# Patient Record
Sex: FEMALE | Race: WHITE | Employment: OTHER | URBAN - METROPOLITAN AREA
[De-identification: names, ages, dates, MRNs, and addresses within clinical notes are randomized per-mention and may not be internally consistent; named-entity substitution may affect disease eponyms.]

---

## 2018-06-14 ENCOUNTER — HOSPITAL ENCOUNTER (OUTPATIENT)
Dept: OTHER | Age: 38
Discharge: HOME | End: 2018-06-14
Payer: MEDICARE

## 2018-06-14 PROCEDURE — 99211 OFF/OP EST MAY X REQ PHY/QHP: CPT

## 2018-09-28 ENCOUNTER — APPOINTMENT (OUTPATIENT)
Dept: CT IMAGING | Age: 38
End: 2018-09-28
Payer: MEDICARE

## 2018-09-28 ENCOUNTER — HOSPITAL ENCOUNTER (EMERGENCY)
Age: 38
Discharge: HOME | End: 2018-09-28
Attending: EMERGENCY MEDICINE
Payer: MEDICARE

## 2018-09-28 VITALS
HEIGHT: 63 IN | HEART RATE: 88 BPM | WEIGHT: 135 LBS | OXYGEN SATURATION: 100 % | RESPIRATION RATE: 14 BRPM | DIASTOLIC BLOOD PRESSURE: 78 MMHG | SYSTOLIC BLOOD PRESSURE: 125 MMHG | BODY MASS INDEX: 23.92 KG/M2 | TEMPERATURE: 97.6 F

## 2018-09-28 DIAGNOSIS — R11.0 NAUSEA: ICD-10-CM

## 2018-09-28 DIAGNOSIS — R10.13 ABDOMINAL PAIN, EPIGASTRIC: Primary | ICD-10-CM

## 2018-09-28 LAB
ALBUMIN SERPL-MCNC: 3.9 G/DL (ref 3.5–5.2)
ALP BLD-CCNC: 75 U/L (ref 35–104)
ALT SERPL-CCNC: 11 U/L (ref 0–32)
ANION GAP SERPL CALCULATED.3IONS-SCNC: 11 MMOL/L (ref 7–16)
AST SERPL-CCNC: 16 U/L (ref 0–31)
BACTERIA: ABNORMAL /HPF
BASOPHILS ABSOLUTE: 0.05 E9/L (ref 0–0.2)
BASOPHILS RELATIVE PERCENT: 0.8 % (ref 0–2)
BETA-HYDROXYBUTYRATE: 0.43 MMOL/L (ref 0.02–0.27)
BILIRUB SERPL-MCNC: 0.4 MG/DL (ref 0–1.2)
BILIRUBIN URINE: NEGATIVE
BLOOD, URINE: NEGATIVE
BUN BLDV-MCNC: 29 MG/DL (ref 6–20)
CALCIUM SERPL-MCNC: 9.7 MG/DL (ref 8.6–10.2)
CHLORIDE BLD-SCNC: 100 MMOL/L (ref 98–107)
CHP ED QC CHECK: NORMAL
CLARITY: CLEAR
CO2: 27 MMOL/L (ref 22–29)
COLOR: YELLOW
CREAT SERPL-MCNC: 2.7 MG/DL (ref 0.5–1)
EOSINOPHILS ABSOLUTE: 0.17 E9/L (ref 0.05–0.5)
EOSINOPHILS RELATIVE PERCENT: 2.7 % (ref 0–6)
GFR AFRICAN AMERICAN: 24
GFR NON-AFRICAN AMERICAN: 20 ML/MIN/1.73
GLUCOSE BLD-MCNC: 76 MG/DL (ref 74–109)
GLUCOSE BLD-MCNC: 84 MG/DL
GLUCOSE URINE: NEGATIVE MG/DL
HCT VFR BLD CALC: 32.8 % (ref 34–48)
HEMOGLOBIN: 10.8 G/DL (ref 11.5–15.5)
IMMATURE GRANULOCYTES #: 0.01 E9/L
IMMATURE GRANULOCYTES %: 0.2 % (ref 0–5)
KETONES, URINE: NEGATIVE MG/DL
LEUKOCYTE ESTERASE, URINE: NEGATIVE
LIPASE: 48 U/L (ref 13–60)
LYMPHOCYTES ABSOLUTE: 2.28 E9/L (ref 1.5–4)
LYMPHOCYTES RELATIVE PERCENT: 36.5 % (ref 20–42)
MCH RBC QN AUTO: 30.9 PG (ref 26–35)
MCHC RBC AUTO-ENTMCNC: 32.9 % (ref 32–34.5)
MCV RBC AUTO: 93.7 FL (ref 80–99.9)
METER GLUCOSE: 84 MG/DL (ref 70–110)
MONOCYTES ABSOLUTE: 0.43 E9/L (ref 0.1–0.95)
MONOCYTES RELATIVE PERCENT: 6.9 % (ref 2–12)
NEUTROPHILS ABSOLUTE: 3.31 E9/L (ref 1.8–7.3)
NEUTROPHILS RELATIVE PERCENT: 52.9 % (ref 43–80)
NITRITE, URINE: NEGATIVE
PDW BLD-RTO: 12.7 FL (ref 11.5–15)
PH UA: 5.5 (ref 5–9)
PH VENOUS: 7.32 (ref 7.3–7.42)
PLATELET # BLD: 164 E9/L (ref 130–450)
PMV BLD AUTO: 11.8 FL (ref 7–12)
POTASSIUM SERPL-SCNC: 4.5 MMOL/L (ref 3.5–5)
PROTEIN UA: 30 MG/DL
RBC # BLD: 3.5 E12/L (ref 3.5–5.5)
RBC UA: ABNORMAL /HPF (ref 0–2)
SODIUM BLD-SCNC: 138 MMOL/L (ref 132–146)
SPECIFIC GRAVITY UA: <=1.005 (ref 1–1.03)
TOTAL PROTEIN: 7 G/DL (ref 6.4–8.3)
UROBILINOGEN, URINE: 0.2 E.U./DL
WBC # BLD: 6.3 E9/L (ref 4.5–11.5)
WBC UA: ABNORMAL /HPF (ref 0–5)

## 2018-09-28 PROCEDURE — 74176 CT ABD & PELVIS W/O CONTRAST: CPT

## 2018-09-28 PROCEDURE — 99284 EMERGENCY DEPT VISIT MOD MDM: CPT

## 2018-09-28 PROCEDURE — 82800 BLOOD PH: CPT

## 2018-09-28 PROCEDURE — 82010 KETONE BODYS QUAN: CPT

## 2018-09-28 PROCEDURE — 82962 GLUCOSE BLOOD TEST: CPT

## 2018-09-28 PROCEDURE — 85025 COMPLETE CBC W/AUTO DIFF WBC: CPT

## 2018-09-28 PROCEDURE — C9113 INJ PANTOPRAZOLE SODIUM, VIA: HCPCS | Performed by: EMERGENCY MEDICINE

## 2018-09-28 PROCEDURE — 96374 THER/PROPH/DIAG INJ IV PUSH: CPT

## 2018-09-28 PROCEDURE — 83690 ASSAY OF LIPASE: CPT

## 2018-09-28 PROCEDURE — 36415 COLL VENOUS BLD VENIPUNCTURE: CPT

## 2018-09-28 PROCEDURE — 81001 URINALYSIS AUTO W/SCOPE: CPT

## 2018-09-28 PROCEDURE — 80053 COMPREHEN METABOLIC PANEL: CPT

## 2018-09-28 PROCEDURE — 96375 TX/PRO/DX INJ NEW DRUG ADDON: CPT

## 2018-09-28 PROCEDURE — 6360000002 HC RX W HCPCS: Performed by: EMERGENCY MEDICINE

## 2018-09-28 RX ORDER — 0.9 % SODIUM CHLORIDE 0.9 %
1000 INTRAVENOUS SOLUTION INTRAVENOUS ONCE
Status: DISCONTINUED | OUTPATIENT
Start: 2018-09-28 | End: 2018-09-28 | Stop reason: HOSPADM

## 2018-09-28 RX ORDER — ONDANSETRON 2 MG/ML
4 INJECTION INTRAMUSCULAR; INTRAVENOUS ONCE
Status: COMPLETED | OUTPATIENT
Start: 2018-09-28 | End: 2018-09-28

## 2018-09-28 RX ORDER — ONDANSETRON 4 MG/1
4 TABLET, ORALLY DISINTEGRATING ORAL EVERY 8 HOURS PRN
Qty: 24 TABLET | Refills: 0 | Status: SHIPPED | OUTPATIENT
Start: 2018-09-28

## 2018-09-28 RX ORDER — PANTOPRAZOLE SODIUM 40 MG/10ML
40 INJECTION, POWDER, LYOPHILIZED, FOR SOLUTION INTRAVENOUS ONCE
Status: COMPLETED | OUTPATIENT
Start: 2018-09-28 | End: 2018-09-28

## 2018-09-28 RX ORDER — PANTOPRAZOLE SODIUM 40 MG/1
40 TABLET, DELAYED RELEASE ORAL DAILY
Qty: 10 TABLET | Refills: 0 | Status: SHIPPED | OUTPATIENT
Start: 2018-09-28 | End: 2020-05-21

## 2018-09-28 RX ADMIN — PANTOPRAZOLE SODIUM 40 MG: 40 INJECTION, POWDER, FOR SOLUTION INTRAVENOUS at 19:31

## 2018-09-28 RX ADMIN — ONDANSETRON 4 MG: 2 INJECTION INTRAMUSCULAR; INTRAVENOUS at 19:31

## 2018-09-28 ASSESSMENT — ENCOUNTER SYMPTOMS
WHEEZING: 0
SHORTNESS OF BREATH: 0
VOMITING: 0
BACK PAIN: 0
EYE PAIN: 0
NAUSEA: 1
DIARRHEA: 1
EYE REDNESS: 0
ABDOMINAL PAIN: 1
COUGH: 0
ABDOMINAL DISTENTION: 0
EYE DISCHARGE: 0
SINUS PRESSURE: 0
SORE THROAT: 0

## 2018-09-28 ASSESSMENT — PAIN SCALES - GENERAL: PAINLEVEL_OUTOF10: 3

## 2018-09-28 ASSESSMENT — PAIN DESCRIPTION - PAIN TYPE: TYPE: ACUTE PAIN

## 2018-09-28 ASSESSMENT — PAIN DESCRIPTION - LOCATION: LOCATION: ABDOMEN

## 2018-09-28 ASSESSMENT — PAIN DESCRIPTION - ORIENTATION: ORIENTATION: MID;UPPER

## 2018-09-28 NOTE — ED NOTES
Radiology Procedure Waiver   Name: Lopez Mosqueda  : 1980  MRN: [id number]    Date:  18    Time: 6:06 PM    Benefits of immediately proceeding with radiology exam(s) without pre-testing outweigh the risks or are not indicated as specified below and therefore the following is/are being waived:    [] Benefits of immediate radiology exam(s) outweigh any risk. OR    Pre-exam testing is not indicated for the following reason(s):  [x] Pregnancy test   [] Patients LMP on-time and regular.   [] Patient had Tubal Ligation or has other Contraception Device. [] Patient  is Menopausal or Premenarcheal.    [x] Patient had Full or Partial Hysterectomy. [] Protocol for CT contrast allegry   [] Patient has tolerated well previously   [] Patient does not have a true allergy    [] MRI Questionnaire     [] BUN/Creatinine   [] Patient age w/no hx of renal dysfunction. [] Patient on Dialysis. [] Recent Normal Labs.   Electronically signed by Remberto Beltran DO on 18 at 6:06 PM               Remberto Beltran DO  Resident  [phone]

## 2018-09-28 NOTE — ED PROVIDER NOTES
52-year-old feeling presents for evaluation of abdominal pain, nausea. He ports since Monday she has had midepigastric abdominal pain described as sharp that is worse with eating and laying down. She has complains of nausea without vomiting. She currently complains rates the pain as 3/10 but has not in all day. She reports history of type I diabetes that is controlled on insulin,. She reports her sugars were persistently near 200 this morning despite not eating much but prior presentation came down to 100. He denies any fevers or chills. She does report dark colored diarrhea today. She reports history of ulcers about 10 years ago. Review of Systems   Constitutional: Negative for chills and fever. HENT: Negative for ear pain, sinus pressure and sore throat. Eyes: Negative for pain, discharge and redness. Respiratory: Negative for cough, shortness of breath and wheezing. Cardiovascular: Negative for chest pain. Gastrointestinal: Positive for abdominal pain, diarrhea and nausea. Negative for abdominal distention and vomiting. Genitourinary: Negative for dysuria and frequency. Musculoskeletal: Negative for arthralgias and back pain. Skin: Negative for rash and wound. Neurological: Negative for weakness and headaches. Hematological: Negative for adenopathy. All other systems reviewed and are negative. Physical Exam   Constitutional: She is oriented to person, place, and time. She appears well-developed and well-nourished. HENT:   Head: Normocephalic and atraumatic. Eyes: Conjunctivae are normal.   Neck: Normal range of motion. Neck supple. Cardiovascular: Normal rate, regular rhythm and normal heart sounds. No murmur heard. Pulmonary/Chest: Effort normal and breath sounds normal. No respiratory distress. She has no wheezes. She has no rales. Abdominal: Soft. Bowel sounds are normal. There is tenderness (epigastric, suprapubic). There is no rebound and no guarding. % 52.9 43.0 - 80.0 %    Immature Granulocytes % 0.2 0.0 - 5.0 %    Lymphocytes % 36.5 20.0 - 42.0 %    Monocytes % 6.9 2.0 - 12.0 %    Eosinophils % 2.7 0.0 - 6.0 %    Basophils % 0.8 0.0 - 2.0 %    Neutrophils # 3.31 1.80 - 7.30 E9/L    Immature Granulocytes # 0.01 E9/L    Lymphocytes # 2.28 1.50 - 4.00 E9/L    Monocytes # 0.43 0.10 - 0.95 E9/L    Eosinophils # 0.17 0.05 - 0.50 E9/L    Basophils # 0.05 0.00 - 0.20 E9/L   pH, venous   Result Value Ref Range    pH, Sherman 7.32 7.30 - 7.42   Urinalysis with Microscopic   Result Value Ref Range    Color, UA Yellow Straw/Yellow    Clarity, UA Clear Clear    Glucose, Ur Negative Negative mg/dL    Bilirubin Urine Negative Negative    Ketones, Urine Negative Negative mg/dL    Specific Gravity, UA <=1.005 1.005 - 1.030    Blood, Urine Negative Negative    pH, UA 5.5 5.0 - 9.0    Protein, UA 30 (A) Negative mg/dL    Urobilinogen, Urine 0.2 <2.0 E.U./dL    Nitrite, Urine Negative Negative    Leukocyte Esterase, Urine Negative Negative    WBC, UA 0-1 0 - 5 /HPF    RBC, UA NONE 0 - 2 /HPF    Bacteria, UA MODERATE (A) /HPF   Comprehensive metabolic panel   Result Value Ref Range    Sodium 138 132 - 146 mmol/L    Potassium 4.5 3.5 - 5.0 mmol/L    Chloride 100 98 - 107 mmol/L    CO2 27 22 - 29 mmol/L    Anion Gap 11 7 - 16 mmol/L    Glucose 76 74 - 109 mg/dL    BUN 29 (H) 6 - 20 mg/dL    CREATININE 2.7 (H) 0.5 - 1.0 mg/dL    GFR Non-African American 20 >=60 mL/min/1.73    GFR African American 24     Calcium 9.7 8.6 - 10.2 mg/dL    Total Protein 7.0 6.4 - 8.3 g/dL    Alb 3.9 3.5 - 5.2 g/dL    Total Bilirubin 0.4 0.0 - 1.2 mg/dL    Alkaline Phosphatase 75 35 - 104 U/L    ALT 11 0 - 32 U/L    AST 16 0 - 31 U/L   Lipase   Result Value Ref Range    Lipase 48 13 - 60 U/L   Beta-Hydroxybutyrate   Result Value Ref Range    Beta-Hydroxybutyrate 0.43 (H) 0.02 - 0.27 mmol/L   POCT Glucose   Result Value Ref Range    Glucose 84 mg/dL    QC OK?  Ok    POCT Glucose   Result Value Ref Range    Meter Nausea or Vomiting, Disp-24 tablet, R-0Print             Diagnosis:  1. Abdominal pain, epigastric    2. Nausea        Disposition:  Patient's disposition: Discharge to home  Patient's condition is stable.              Meghan Chavez DO  Resident  09/28/18 [id number]

## 2018-11-21 ENCOUNTER — HOSPITAL ENCOUNTER (OUTPATIENT)
Age: 38
Discharge: HOME | End: 2018-11-21
Payer: MEDICARE

## 2018-11-21 LAB
ALBUMIN SERPL-MCNC: 4.5 G/DL (ref 3.5–5.2)
ALP BLD-CCNC: 75 U/L (ref 35–104)
ALT SERPL-CCNC: 11 U/L (ref 0–32)
ANION GAP SERPL CALCULATED.3IONS-SCNC: 13 MMOL/L (ref 7–16)
AST SERPL-CCNC: 16 U/L (ref 0–31)
BACTERIA: ABNORMAL /HPF
BASOPHILS ABSOLUTE: 0.05 E9/L (ref 0–0.2)
BASOPHILS RELATIVE PERCENT: 0.7 % (ref 0–2)
BILIRUB SERPL-MCNC: 0.4 MG/DL (ref 0–1.2)
BILIRUBIN URINE: NEGATIVE
BLOOD, URINE: NEGATIVE
BUN BLDV-MCNC: 33 MG/DL (ref 6–20)
CALCIUM SERPL-MCNC: 9.5 MG/DL (ref 8.6–10.2)
CHLORIDE BLD-SCNC: 96 MMOL/L (ref 98–107)
CLARITY: CLEAR
CO2: 25 MMOL/L (ref 22–29)
COLOR: YELLOW
CREAT SERPL-MCNC: 2.8 MG/DL (ref 0.5–1)
CREATININE URINE: 50 MG/DL (ref 29–226)
EOSINOPHILS ABSOLUTE: 1.33 E9/L (ref 0.05–0.5)
EOSINOPHILS RELATIVE PERCENT: 19 % (ref 0–6)
GFR AFRICAN AMERICAN: 23
GFR NON-AFRICAN AMERICAN: 19 ML/MIN/1.73
GLUCOSE BLD-MCNC: 134 MG/DL (ref 74–99)
GLUCOSE URINE: NEGATIVE MG/DL
HCT VFR BLD CALC: 31.6 % (ref 34–48)
HEMOGLOBIN: 10.4 G/DL (ref 11.5–15.5)
IMMATURE GRANULOCYTES #: 0.01 E9/L
IMMATURE GRANULOCYTES %: 0.1 % (ref 0–5)
KETONES, URINE: NEGATIVE MG/DL
LEUKOCYTE ESTERASE, URINE: ABNORMAL
LYMPHOCYTES ABSOLUTE: 2.23 E9/L (ref 1.5–4)
LYMPHOCYTES RELATIVE PERCENT: 31.9 % (ref 20–42)
MAGNESIUM: 2.2 MG/DL (ref 1.6–2.6)
MCH RBC QN AUTO: 31 PG (ref 26–35)
MCHC RBC AUTO-ENTMCNC: 32.9 % (ref 32–34.5)
MCV RBC AUTO: 94 FL (ref 80–99.9)
MONOCYTES ABSOLUTE: 0.5 E9/L (ref 0.1–0.95)
MONOCYTES RELATIVE PERCENT: 7.2 % (ref 2–12)
NEUTROPHILS ABSOLUTE: 2.87 E9/L (ref 1.8–7.3)
NEUTROPHILS RELATIVE PERCENT: 41.1 % (ref 43–80)
NITRITE, URINE: NEGATIVE
PARATHYROID HORMONE INTACT: 58 PG/ML (ref 15–65)
PDW BLD-RTO: 12.4 FL (ref 11.5–15)
PH UA: 6 (ref 5–9)
PHOSPHORUS: 3.3 MG/DL (ref 2.5–4.5)
PLATELET # BLD: 194 E9/L (ref 130–450)
PMV BLD AUTO: 12.1 FL (ref 7–12)
POTASSIUM SERPL-SCNC: 4.4 MMOL/L (ref 3.5–5)
PROTEIN PROTEIN: 26 MG/DL (ref 0–12)
PROTEIN UA: ABNORMAL MG/DL
PROTEIN/CREAT RATIO: 0.5
PROTEIN/CREAT RATIO: 0.5 (ref 0–0.2)
RBC # BLD: 3.36 E12/L (ref 3.5–5.5)
RBC UA: ABNORMAL /HPF (ref 0–2)
SODIUM BLD-SCNC: 134 MMOL/L (ref 132–146)
SPECIFIC GRAVITY UA: <=1.005 (ref 1–1.03)
TOTAL PROTEIN: 7.6 G/DL (ref 6.4–8.3)
URIC ACID, SERUM: 7.6 MG/DL (ref 2.4–5.7)
UROBILINOGEN, URINE: 0.2 E.U./DL
VITAMIN D 25-HYDROXY: >120 NG/ML (ref 30–100)
WBC # BLD: 7 E9/L (ref 4.5–11.5)
WBC UA: ABNORMAL /HPF (ref 0–5)

## 2018-11-21 PROCEDURE — 87088 URINE BACTERIA CULTURE: CPT

## 2018-11-21 PROCEDURE — 84100 ASSAY OF PHOSPHORUS: CPT

## 2018-11-21 PROCEDURE — 81001 URINALYSIS AUTO W/SCOPE: CPT

## 2018-11-21 PROCEDURE — 84550 ASSAY OF BLOOD/URIC ACID: CPT

## 2018-11-21 PROCEDURE — 83735 ASSAY OF MAGNESIUM: CPT

## 2018-11-21 PROCEDURE — 82306 VITAMIN D 25 HYDROXY: CPT

## 2018-11-21 PROCEDURE — 80053 COMPREHEN METABOLIC PANEL: CPT

## 2018-11-21 PROCEDURE — 85025 COMPLETE CBC W/AUTO DIFF WBC: CPT

## 2018-11-21 PROCEDURE — 36415 COLL VENOUS BLD VENIPUNCTURE: CPT

## 2018-11-21 PROCEDURE — 82570 ASSAY OF URINE CREATININE: CPT

## 2018-11-21 PROCEDURE — 83970 ASSAY OF PARATHORMONE: CPT

## 2018-11-21 PROCEDURE — 84156 ASSAY OF PROTEIN URINE: CPT

## 2018-11-23 LAB — URINE CULTURE, ROUTINE: NORMAL

## 2019-02-12 ENCOUNTER — OFFICE VISIT (OUTPATIENT)
Dept: ENDOCRINOLOGY | Age: 39
End: 2019-02-12
Payer: MEDICARE

## 2019-02-12 VITALS
RESPIRATION RATE: 16 BRPM | DIASTOLIC BLOOD PRESSURE: 80 MMHG | WEIGHT: 140.8 LBS | SYSTOLIC BLOOD PRESSURE: 130 MMHG | HEIGHT: 63 IN | TEMPERATURE: 97.4 F | BODY MASS INDEX: 24.95 KG/M2 | HEART RATE: 90 BPM | OXYGEN SATURATION: 99 %

## 2019-02-12 LAB — HBA1C MFR BLD: 7.2 %

## 2019-02-12 PROCEDURE — G8484 FLU IMMUNIZE NO ADMIN: HCPCS | Performed by: INTERNAL MEDICINE

## 2019-02-12 PROCEDURE — 83036 HEMOGLOBIN GLYCOSYLATED A1C: CPT | Performed by: INTERNAL MEDICINE

## 2019-02-12 PROCEDURE — G8420 CALC BMI NORM PARAMETERS: HCPCS | Performed by: INTERNAL MEDICINE

## 2019-02-12 PROCEDURE — 99205 OFFICE O/P NEW HI 60 MIN: CPT | Performed by: INTERNAL MEDICINE

## 2019-02-12 PROCEDURE — 3045F PR MOST RECENT HEMOGLOBIN A1C LEVEL 7.0-9.0%: CPT | Performed by: INTERNAL MEDICINE

## 2019-02-12 PROCEDURE — G8427 DOCREV CUR MEDS BY ELIG CLIN: HCPCS | Performed by: INTERNAL MEDICINE

## 2019-02-12 PROCEDURE — 1036F TOBACCO NON-USER: CPT | Performed by: INTERNAL MEDICINE

## 2019-02-12 PROCEDURE — 2022F DILAT RTA XM EVC RTNOPTHY: CPT | Performed by: INTERNAL MEDICINE

## 2019-02-12 RX ORDER — OMEPRAZOLE 40 MG/1
40 CAPSULE, DELAYED RELEASE ORAL PRN
COMMUNITY

## 2019-03-21 LAB
ALBUMIN: 4
CALCIUM SERPL-MCNC: 9.3 MG/DL

## 2019-03-28 ENCOUNTER — OFFICE VISIT (OUTPATIENT)
Dept: ENDOCRINOLOGY | Age: 39
End: 2019-03-28
Payer: MEDICARE

## 2019-03-28 VITALS
OXYGEN SATURATION: 99 % | DIASTOLIC BLOOD PRESSURE: 80 MMHG | HEART RATE: 83 BPM | BODY MASS INDEX: 24.98 KG/M2 | WEIGHT: 141 LBS | HEIGHT: 63 IN | SYSTOLIC BLOOD PRESSURE: 130 MMHG

## 2019-03-28 DIAGNOSIS — E10.22 TYPE 1 DIABETES MELLITUS WITH STAGE 4 CHRONIC KIDNEY DISEASE (HCC): Primary | ICD-10-CM

## 2019-03-28 DIAGNOSIS — N18.4 TYPE 1 DIABETES MELLITUS WITH STAGE 4 CHRONIC KIDNEY DISEASE (HCC): Primary | ICD-10-CM

## 2019-03-28 PROCEDURE — G8427 DOCREV CUR MEDS BY ELIG CLIN: HCPCS | Performed by: INTERNAL MEDICINE

## 2019-03-28 PROCEDURE — 99214 OFFICE O/P EST MOD 30 MIN: CPT | Performed by: INTERNAL MEDICINE

## 2019-03-28 PROCEDURE — 3045F PR MOST RECENT HEMOGLOBIN A1C LEVEL 7.0-9.0%: CPT | Performed by: INTERNAL MEDICINE

## 2019-03-28 PROCEDURE — G8484 FLU IMMUNIZE NO ADMIN: HCPCS | Performed by: INTERNAL MEDICINE

## 2019-03-28 PROCEDURE — 1036F TOBACCO NON-USER: CPT | Performed by: INTERNAL MEDICINE

## 2019-03-28 PROCEDURE — 2022F DILAT RTA XM EVC RTNOPTHY: CPT | Performed by: INTERNAL MEDICINE

## 2019-03-28 PROCEDURE — G8420 CALC BMI NORM PARAMETERS: HCPCS | Performed by: INTERNAL MEDICINE

## 2019-03-29 DIAGNOSIS — E10.22 TYPE 1 DIABETES MELLITUS WITH STAGE 4 CHRONIC KIDNEY DISEASE (HCC): Primary | ICD-10-CM

## 2019-03-29 DIAGNOSIS — N18.4 TYPE 1 DIABETES MELLITUS WITH STAGE 4 CHRONIC KIDNEY DISEASE (HCC): Primary | ICD-10-CM

## 2019-04-03 ENCOUNTER — HOSPITAL ENCOUNTER (EMERGENCY)
Age: 39
Discharge: HOME | End: 2019-04-03
Payer: MEDICARE

## 2019-04-03 ENCOUNTER — APPOINTMENT (OUTPATIENT)
Dept: GENERAL RADIOLOGY | Age: 39
End: 2019-04-03
Payer: MEDICARE

## 2019-04-03 VITALS
BODY MASS INDEX: 23.92 KG/M2 | TEMPERATURE: 97.6 F | RESPIRATION RATE: 14 BRPM | WEIGHT: 135 LBS | OXYGEN SATURATION: 100 % | DIASTOLIC BLOOD PRESSURE: 80 MMHG | HEIGHT: 63 IN | HEART RATE: 90 BPM | SYSTOLIC BLOOD PRESSURE: 131 MMHG

## 2019-04-03 DIAGNOSIS — M54.50 ACUTE EXACERBATION OF CHRONIC LOW BACK PAIN: Primary | ICD-10-CM

## 2019-04-03 DIAGNOSIS — G89.29 ACUTE EXACERBATION OF CHRONIC LOW BACK PAIN: Primary | ICD-10-CM

## 2019-04-03 LAB
BILIRUBIN URINE: NEGATIVE
BLOOD, URINE: NEGATIVE
CLARITY: CLEAR
COLOR: YELLOW
GLUCOSE URINE: NEGATIVE MG/DL
HCG, URINE, POC: NEGATIVE
KETONES, URINE: NEGATIVE MG/DL
LEUKOCYTE ESTERASE, URINE: NEGATIVE
Lab: NORMAL
NEGATIVE QC PASS/FAIL: NORMAL
NITRITE, URINE: NEGATIVE
PH UA: 6 (ref 5–9)
POSITIVE QC PASS/FAIL: NORMAL
PROTEIN UA: NEGATIVE MG/DL
SPECIFIC GRAVITY UA: <=1.005 (ref 1–1.03)
UROBILINOGEN, URINE: 0.2 E.U./DL

## 2019-04-03 PROCEDURE — 99283 EMERGENCY DEPT VISIT LOW MDM: CPT

## 2019-04-03 PROCEDURE — 72100 X-RAY EXAM L-S SPINE 2/3 VWS: CPT

## 2019-04-03 PROCEDURE — 81003 URINALYSIS AUTO W/O SCOPE: CPT

## 2019-04-03 PROCEDURE — 96372 THER/PROPH/DIAG INJ SC/IM: CPT

## 2019-04-03 PROCEDURE — 6370000000 HC RX 637 (ALT 250 FOR IP): Performed by: NURSE PRACTITIONER

## 2019-04-03 PROCEDURE — 6360000002 HC RX W HCPCS: Performed by: NURSE PRACTITIONER

## 2019-04-03 RX ORDER — ORPHENADRINE CITRATE 100 MG/1
100 TABLET, EXTENDED RELEASE ORAL 2 TIMES DAILY
Qty: 10 TABLET | Refills: 0 | Status: SHIPPED | OUTPATIENT
Start: 2019-04-03 | End: 2019-04-08

## 2019-04-03 RX ORDER — ACETAMINOPHEN 500 MG
1000 TABLET ORAL ONCE
Status: COMPLETED | OUTPATIENT
Start: 2019-04-03 | End: 2019-04-03

## 2019-04-03 RX ORDER — ORPHENADRINE CITRATE 30 MG/ML
60 INJECTION INTRAMUSCULAR; INTRAVENOUS ONCE
Status: COMPLETED | OUTPATIENT
Start: 2019-04-03 | End: 2019-04-03

## 2019-04-03 RX ADMIN — ACETAMINOPHEN 1000 MG: 500 TABLET ORAL at 18:55

## 2019-04-03 RX ADMIN — ORPHENADRINE CITRATE 60 MG: 30 INJECTION INTRAMUSCULAR; INTRAVENOUS at 18:55

## 2019-04-03 ASSESSMENT — PAIN SCALES - GENERAL
PAINLEVEL_OUTOF10: 7
PAINLEVEL_OUTOF10: 7

## 2019-04-03 ASSESSMENT — PAIN DESCRIPTION - FREQUENCY: FREQUENCY: CONTINUOUS

## 2019-04-03 ASSESSMENT — PAIN DESCRIPTION - PAIN TYPE: TYPE: ACUTE PAIN

## 2019-04-03 ASSESSMENT — PAIN DESCRIPTION - ORIENTATION: ORIENTATION: LOWER

## 2019-04-03 ASSESSMENT — PAIN DESCRIPTION - LOCATION: LOCATION: BACK

## 2019-04-03 ASSESSMENT — PAIN DESCRIPTION - DESCRIPTORS: DESCRIPTORS: CONSTANT

## 2019-04-03 ASSESSMENT — PAIN DESCRIPTION - ONSET: ONSET: SUDDEN

## 2019-04-03 ASSESSMENT — PAIN - FUNCTIONAL ASSESSMENT: PAIN_FUNCTIONAL_ASSESSMENT: ACTIVITIES ARE NOT PREVENTED

## 2019-04-03 NOTE — ED PROVIDER NOTES
Independent MLP      HPI:  4/3/19,   Time: 6:10 PM         Lena Henley is a 41 y.o. female presenting to the ED for onset today of Low back pain radiating down the left leg to calf but not today, beginning 3 days ago waxing and waning. Has Hx of Lumbar disc herniation and not in pain management. Tylenol no help and cannot take NSAIDS r/t late CKD. The complaint has been persistent, moderate in severity, and worsened by movement of the trunk. Denies F/C/N/V/SOB/HA/CP/Abd Pain/visual changes or eye pain/fall, injury, or other trauma/LOC or Syncope/Lightheadedness/change in sensation, numbness, tingling, function of neck, facial structures, bilateral upper and lower extremities /change in function of or incontinence of bowel or bladder /hematuria or dysuria. ROS:   Pertinent positives and negatives are stated within HPI, all other systems reviewed and are negative.  --------------------------------------------- PAST HISTORY ---------------------------------------------  Past Medical History:  has a past medical history of Asthma, Chronic renal failure, Degeneration of cervical intervertebral disc, Depression, Diabetic retinopathy ([facility] [address] Hyperlipidemia, Hypertension, Peripheral autonomic neuropathy due to diabetes mellitus ([facility] [address] and Type 1 diabetes mellitus ([facility] [address] Past Surgical History:  has a past surgical history that includes vitrectomy (Right, 2009); vitrectomy (Left, 2008); transthoracic echocardiogram (4/9/13); shoulder surgery; and Hysterectomy, total abdominal.    Social History:  reports that she has never smoked. She has never used smokeless tobacco. She reports that she drinks alcohol. She reports that she does not use drugs. Family History: family history includes Arthritis in her father and mother; Diabetes in her maternal cousin, maternal cousin, maternal grandmother, and maternal uncle; Other in her brother. The patients home medications have been reviewed.     Allergies: Lisinopril and Morphine    -------------------------------------------------- RESULTS -------------------------------------------------  All laboratory and radiology results have been personally reviewed by myself   LABS:  Results for orders placed or performed during the hospital encounter of 04/03/19   Urinalysis, reflex to microscopic   Result Value Ref Range    Color, UA Yellow Straw/Yellow    Clarity, UA Clear Clear    Glucose, Ur Negative Negative mg/dL    Bilirubin Urine Negative Negative    Ketones, Urine Negative Negative mg/dL    Specific Gravity, UA <=1.005 1.005 - 1.030    Blood, Urine Negative Negative    pH, UA 6.0 5.0 - 9.0    Protein, UA Negative Negative mg/dL    Urobilinogen, Urine 0.2 <2.0 E.U./dL    Nitrite, Urine Negative Negative    Leukocyte Esterase, Urine Negative Negative   POC Pregnancy Urine   Result Value Ref Range    HCG, Urine, POC Negative Negative    Lot Number [id number]     Positive QC Pass/Fail Pass     Negative QC Pass/Fail Pass        RADIOLOGY:  Interpreted by Radiologist.  XR LUMBAR SPINE (2-3 VIEWS)   Final Result   Mild degenerative joint disease of the lumbar spine, similar to the   prior exam.                ------------------------- NURSING NOTES AND VITALS REVIEWED ---------------------------   The nursing notes within the ED encounter and vital signs as below have been reviewed.    /80   Pulse 90   Temp 97.6 °F (36.4 °C) (Oral)   Resp 14   Ht 5' 3\" (1.6 m)   Wt 135 lb (61.2 kg)   LMP 05/21/2014   SpO2 100%   BMI 23.91 kg/m²   Oxygen Saturation Interpretation: Normal      ---------------------------------------------------PHYSICAL EXAM--------------------------------------      Constitutional/General: Alert and oriented x3, well appearing, non toxic in NAD  Head: NC/AT  Eye: PERRL  Mouth:  MMM  Neck: Supple, full ROM, no ML cervical vertebral bone tenderness throughout, no meningeal signs  Pulmonary: Lungs clear to auscultation bilaterally, no wheezes, rales, or

## 2019-04-05 DIAGNOSIS — N18.4 TYPE 1 DIABETES MELLITUS WITH STAGE 4 CHRONIC KIDNEY DISEASE (HCC): Primary | ICD-10-CM

## 2019-04-05 DIAGNOSIS — E10.22 TYPE 1 DIABETES MELLITUS WITH STAGE 4 CHRONIC KIDNEY DISEASE (HCC): Primary | ICD-10-CM

## 2019-04-05 RX ORDER — BLOOD-GLUCOSE SENSOR
EACH MISCELLANEOUS
COMMUNITY
End: 2019-04-05 | Stop reason: SDUPTHER

## 2019-04-07 RX ORDER — BLOOD-GLUCOSE SENSOR
EACH MISCELLANEOUS
Qty: 4 EACH | Refills: 5 | Status: SHIPPED | OUTPATIENT
Start: 2019-04-07

## 2019-04-08 ENCOUNTER — TELEPHONE (OUTPATIENT)
Dept: ENDOCRINOLOGY | Age: 39
End: 2019-04-08

## 2019-04-08 NOTE — TELEPHONE ENCOUNTER
Patient is looking for a new dermatologist, and a new neurologist in the area. She was wondering if you had anyone that you would recommend.

## 2019-04-24 ENCOUNTER — HOSPITAL ENCOUNTER (OUTPATIENT)
Dept: CARDIOLOGY | Age: 39
Discharge: HOME | End: 2019-04-24
Payer: MEDICARE

## 2019-04-24 VITALS
DIASTOLIC BLOOD PRESSURE: 70 MMHG | SYSTOLIC BLOOD PRESSURE: 124 MMHG | HEART RATE: 98 BPM | WEIGHT: 135 LBS | OXYGEN SATURATION: 99 % | BODY MASS INDEX: 23.92 KG/M2 | HEIGHT: 63 IN

## 2019-04-24 DIAGNOSIS — R06.02 SHORTNESS OF BREATH: Primary | ICD-10-CM

## 2019-04-24 LAB
LV EF: 73 %
LVEF MODALITY: NORMAL

## 2019-04-24 PROCEDURE — 2580000003 HC RX 258: Performed by: INTERNAL MEDICINE

## 2019-04-24 PROCEDURE — 93017 CV STRESS TEST TRACING ONLY: CPT

## 2019-04-24 PROCEDURE — 3430000000 HC RX DIAGNOSTIC RADIOPHARMACEUTICAL: Performed by: INTERNAL MEDICINE

## 2019-04-24 PROCEDURE — 78452 HT MUSCLE IMAGE SPECT MULT: CPT

## 2019-04-24 PROCEDURE — A9500 TC99M SESTAMIBI: HCPCS | Performed by: INTERNAL MEDICINE

## 2019-04-24 RX ORDER — SODIUM CHLORIDE 0.9 % (FLUSH) 0.9 %
10 SYRINGE (ML) INJECTION PRN
Status: DISCONTINUED | OUTPATIENT
Start: 2019-04-24 | End: 2019-04-25 | Stop reason: HOSPADM

## 2019-04-24 RX ADMIN — Medication 33 MILLICURIE: at 09:24

## 2019-04-24 RX ADMIN — Medication 9.4 MILLICURIE: at 08:01

## 2019-04-24 RX ADMIN — Medication 10 ML: at 08:01

## 2019-04-24 RX ADMIN — Medication 10 ML: at 09:24

## 2019-04-24 NOTE — PROCEDURES
[address] [facility] [address] [phone]                Exercise Stress Nuclear Gated SPECT Study    Name: [facility] Account Number: [de-identified]    :  1980      Sex: female              Date of Study:  2019    Height: 5' 3\" (160 cm)  Weight: 135 lb (61.2 kg)     Ordering Provider: James Jacob MD          PCP: Nacho Montgomery DO      Cardiologist: Leonard Arellano                        Interpreting Physician: Adenike Vieyra. Hipolito Mcclain MD  _________________________________________________________________________________    Indication:   Detecting the presence and location of coronary artery disease   Risk Stratification Preoperative     Clinical History:   Patient has no known history of coronary artery disease. Resting ECG:    HR 91 bpm  Normal sinus rhythm    Exercise: The patient exercised using a Michele protocol, completing 6:30 minutes and reaching an estimated work load of 4.15 metabolic equivalents (METS). Resting HR was 91. Peak exercise heart rate was 153 ( 86% of maximum predicted heart rate for age). Baseline /70. Peak exercise /68. The blood pressure response to exercise was normal      Exercise was terminated due to mild  dyspnea with mild fatigue. The patient experienced no chest pain with exercise. Pulse oximetry was used to monitor oxygen saturation during the stress test.  The study was performed on Room Air. The resting pulse oximeter was 99%. The lowest O2 saturation seen during exercise was 98 %. The average O2 saturation with exercise was 98.5 %. Exercise ECG:   The patient demonstrated no arrhythmias during exercise. With exercise, there were no ST segment changes of significance at the heart rate achieved. Balbuena treadmill score was 6.5 implying low risk.      IMAGING: Myocardial perfusion imaging was performed at rest 30-35 minutes following the

## 2019-05-01 ENCOUNTER — TELEPHONE (OUTPATIENT)
Dept: ENDOCRINOLOGY | Age: 39
End: 2019-05-01

## 2019-05-01 DIAGNOSIS — E11.42 DIABETIC POLYNEUROPATHY ASSOCIATED WITH TYPE 2 DIABETES MELLITUS (HCC): ICD-10-CM

## 2019-05-01 DIAGNOSIS — E10.42 DIABETIC POLYNEUROPATHY ASSOCIATED WITH TYPE 1 DIABETES MELLITUS (HCC): Primary | ICD-10-CM

## 2019-05-01 NOTE — TELEPHONE ENCOUNTER
Can you please put a referral in for neurology with Dr Negro Officer. Once that is in, Garrick can schedule her. Thank you!

## 2019-05-16 ENCOUNTER — TELEPHONE (OUTPATIENT)
Dept: ENDOCRINOLOGY | Age: 39
End: 2019-05-16

## 2019-05-16 NOTE — TELEPHONE ENCOUNTER
Ms.Francesca called in asking about the referral you placed for neurology. The referral is for EMG only. She would like to know if she needs the EMG or if she needs to actually see Dr. Carmen Mckeon for an appointment. If so she would need a new referral.    Please advise.  Thank you, TZ

## 2019-05-17 ENCOUNTER — TELEPHONE (OUTPATIENT)
Dept: ENDOCRINOLOGY | Age: 39
End: 2019-05-17

## 2019-05-17 DIAGNOSIS — N18.4 TYPE 1 DIABETES MELLITUS WITH STAGE 4 CHRONIC KIDNEY DISEASE (HCC): ICD-10-CM

## 2019-05-17 DIAGNOSIS — E10.22 TYPE 1 DIABETES MELLITUS WITH STAGE 4 CHRONIC KIDNEY DISEASE (HCC): ICD-10-CM

## 2019-05-17 DIAGNOSIS — E11.42 DIABETIC POLYNEUROPATHY ASSOCIATED WITH TYPE 2 DIABETES MELLITUS (HCC): Primary | ICD-10-CM

## 2019-05-17 NOTE — TELEPHONE ENCOUNTER
Needs new/separate referral for [facility]  [facility] 45 05/17/19

## 2019-05-17 NOTE — TELEPHONE ENCOUNTER
I see an order under the referral tab for Dr. Chary Dawn from 5/1/19. The EMG order is under the \"Other Orders\" tab. Neurology wants all pt's referred for diabetic neuropathy to have an EMG first. Let me know if I need to do something else.
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
## 2019-05-23 ENCOUNTER — OFFICE VISIT (OUTPATIENT)
Dept: PAIN MANAGEMENT | Age: 39
End: 2019-05-23
Payer: MEDICARE

## 2019-05-23 VITALS
HEART RATE: 84 BPM | OXYGEN SATURATION: 98 % | BODY MASS INDEX: 23.92 KG/M2 | TEMPERATURE: 98.6 F | RESPIRATION RATE: 18 BRPM | SYSTOLIC BLOOD PRESSURE: 112 MMHG | HEIGHT: 63 IN | WEIGHT: 135 LBS | DIASTOLIC BLOOD PRESSURE: 78 MMHG

## 2019-05-23 DIAGNOSIS — G89.29 CHRONIC RIGHT-SIDED LOW BACK PAIN WITHOUT SCIATICA: ICD-10-CM

## 2019-05-23 DIAGNOSIS — M54.6 THORACIC SPINE PAIN: ICD-10-CM

## 2019-05-23 DIAGNOSIS — M51.26 LUMBAR HERNIATED DISC: ICD-10-CM

## 2019-05-23 DIAGNOSIS — M54.50 CHRONIC RIGHT-SIDED LOW BACK PAIN WITHOUT SCIATICA: ICD-10-CM

## 2019-05-23 DIAGNOSIS — M51.36 DDD (DEGENERATIVE DISC DISEASE), LUMBAR: Primary | ICD-10-CM

## 2019-05-23 PROCEDURE — 1036F TOBACCO NON-USER: CPT | Performed by: PHYSICIAN ASSISTANT

## 2019-05-23 PROCEDURE — G8420 CALC BMI NORM PARAMETERS: HCPCS | Performed by: PHYSICIAN ASSISTANT

## 2019-05-23 PROCEDURE — G8427 DOCREV CUR MEDS BY ELIG CLIN: HCPCS | Performed by: PHYSICIAN ASSISTANT

## 2019-05-23 PROCEDURE — 99204 OFFICE O/P NEW MOD 45 MIN: CPT | Performed by: PHYSICIAN ASSISTANT

## 2019-05-23 NOTE — PROGRESS NOTES
Ava Olivarez presents to the [facility] on 5/23/2019. Marie Smith is complaining of pain in the back upper the worse . The pain is constant. The pain is described as aching and throbbing. Pain is rated on her best day at a 3, on her worst day at a 3, and on average at a 3 on the VAS scale. She took her last dose of Tylenol      Any procedures since your last visit: No,    Pacemaker or defibrilator: No managed by none.     She has not been on anticoagulation medication  /78   Pulse 84   Temp 98.6 °F (37 °C)   Resp 18   Ht 5' 3\" (1.6 m)   Wt 135 lb (61.2 kg)   LMP 05/21/2014   SpO2 98%   BMI 23.91 kg/m²

## 2019-05-23 NOTE — Clinical Note
Patient is interested in a support group for type 1 diabetes patients in kidney failure as she states that she does not have much support at home. Would also be interested in volunteering. Can one of your staff contact her if there is anywhere appropriate for her to go? Thank you!

## 2019-05-30 ENCOUNTER — TELEPHONE (OUTPATIENT)
Dept: PAIN MANAGEMENT | Age: 39
End: 2019-05-30

## 2019-05-30 NOTE — TELEPHONE ENCOUNTER
Patient called today and asked what we needed from [facility]. She is getting the CD and written reports.

## 2019-07-02 ENCOUNTER — OFFICE VISIT (OUTPATIENT)
Dept: ENDOCRINOLOGY | Age: 39
End: 2019-07-02
Payer: MEDICARE

## 2019-07-02 VITALS
WEIGHT: 140.2 LBS | DIASTOLIC BLOOD PRESSURE: 72 MMHG | SYSTOLIC BLOOD PRESSURE: 136 MMHG | BODY MASS INDEX: 24.84 KG/M2 | HEIGHT: 63 IN | OXYGEN SATURATION: 100 % | HEART RATE: 95 BPM

## 2019-07-02 DIAGNOSIS — N18.4 TYPE 1 DIABETES MELLITUS WITH STAGE 4 CHRONIC KIDNEY DISEASE (HCC): Primary | ICD-10-CM

## 2019-07-02 DIAGNOSIS — E10.22 TYPE 1 DIABETES MELLITUS WITH STAGE 4 CHRONIC KIDNEY DISEASE (HCC): Primary | ICD-10-CM

## 2019-07-02 LAB — HBA1C MFR BLD: 7.5 %

## 2019-07-02 PROCEDURE — 83036 HEMOGLOBIN GLYCOSYLATED A1C: CPT | Performed by: INTERNAL MEDICINE

## 2019-07-02 PROCEDURE — G8427 DOCREV CUR MEDS BY ELIG CLIN: HCPCS | Performed by: INTERNAL MEDICINE

## 2019-07-02 PROCEDURE — G8420 CALC BMI NORM PARAMETERS: HCPCS | Performed by: INTERNAL MEDICINE

## 2019-07-02 PROCEDURE — 2022F DILAT RTA XM EVC RTNOPTHY: CPT | Performed by: INTERNAL MEDICINE

## 2019-07-02 PROCEDURE — 3045F PR MOST RECENT HEMOGLOBIN A1C LEVEL 7.0-9.0%: CPT | Performed by: INTERNAL MEDICINE

## 2019-07-02 PROCEDURE — 1036F TOBACCO NON-USER: CPT | Performed by: INTERNAL MEDICINE

## 2019-07-02 PROCEDURE — 99214 OFFICE O/P EST MOD 30 MIN: CPT | Performed by: INTERNAL MEDICINE

## 2019-07-19 ENCOUNTER — OFFICE VISIT (OUTPATIENT)
Dept: NEUROLOGY | Age: 39
End: 2019-07-19
Payer: MEDICARE

## 2019-07-19 ENCOUNTER — OFFICE VISIT (OUTPATIENT)
Dept: NEUROLOGY | Age: 39
End: 2019-07-19

## 2019-07-19 VITALS
BODY MASS INDEX: 23.92 KG/M2 | HEIGHT: 63 IN | DIASTOLIC BLOOD PRESSURE: 78 MMHG | SYSTOLIC BLOOD PRESSURE: 130 MMHG | WEIGHT: 135 LBS

## 2019-07-19 DIAGNOSIS — N18.4 CKD (CHRONIC KIDNEY DISEASE) STAGE 4, GFR 15-29 ML/MIN (HCC): ICD-10-CM

## 2019-07-19 DIAGNOSIS — G57.93 NEUROPATHIC PAIN OF BOTH FEET: ICD-10-CM

## 2019-07-19 DIAGNOSIS — R94.131 ABNORMAL EMG: ICD-10-CM

## 2019-07-19 DIAGNOSIS — E10.42 DIABETIC PERIPHERAL NEUROPATHY ASSOCIATED WITH TYPE 1 DIABETES MELLITUS (HCC): Primary | ICD-10-CM

## 2019-07-19 DIAGNOSIS — R20.2 PARESTHESIA OF BOTH FEET: ICD-10-CM

## 2019-07-19 DIAGNOSIS — G31.84 MILD COGNITIVE IMPAIRMENT: ICD-10-CM

## 2019-07-19 DIAGNOSIS — E10.42 DIABETIC PERIPHERAL NEUROPATHY ASSOCIATED WITH TYPE 1 DIABETES MELLITUS (HCC): Chronic | ICD-10-CM

## 2019-07-19 PROCEDURE — 99215 OFFICE O/P EST HI 40 MIN: CPT | Performed by: PSYCHIATRY & NEUROLOGY

## 2019-07-19 PROCEDURE — 2022F DILAT RTA XM EVC RTNOPTHY: CPT | Performed by: PSYCHIATRY & NEUROLOGY

## 2019-07-19 PROCEDURE — 1036F TOBACCO NON-USER: CPT | Performed by: PSYCHIATRY & NEUROLOGY

## 2019-07-19 PROCEDURE — G8420 CALC BMI NORM PARAMETERS: HCPCS | Performed by: PSYCHIATRY & NEUROLOGY

## 2019-07-19 PROCEDURE — 3045F PR MOST RECENT HEMOGLOBIN A1C LEVEL 7.0-9.0%: CPT | Performed by: PSYCHIATRY & NEUROLOGY

## 2019-07-19 PROCEDURE — G8427 DOCREV CUR MEDS BY ELIG CLIN: HCPCS | Performed by: PSYCHIATRY & NEUROLOGY

## 2019-07-19 ASSESSMENT — ENCOUNTER SYMPTOMS
EYES NEGATIVE: 1
RESPIRATORY NEGATIVE: 1
GASTROINTESTINAL NEGATIVE: 1
ALLERGIC/IMMUNOLOGIC NEGATIVE: 1

## 2019-07-19 NOTE — PROGRESS NOTES
Neurology Consult Note:    NOTE: REPORT IN ERROR-SEE COMPLETE CONSULT NOTE,     Patient: Leeann Rivas  : 1980  Date: 19  Referring provider: Oliver Connolly MD; Sandhya Baugh, III, DO    Referral to Neurology: History chronic diabetic neuropathy, type 1 diabetes mellitus. Dear Oliver Connolly MD;Justo Wyman, III, DO    I have seen Leeann Rivas for neurologic evaluation of diabetic peripheral neuropathy, a 72-year-old woman with type 1 diabetes mellitus and diabetic peripheral neuropathy, chronic kidney disease. Chart notes indicate that she did not wish to have another NCS/EMG study. Lab Data: Reviewed, Hgb A1c 7.5. Labs reviewed from 2018, glucose 134, hemoglobin A1c 7.2, normal CBC. Imaging Data: Lumbar spine x-ray, 4/3/2019, mild degenerative disease at L5/S1 level, no fracture. Current Outpatient Medications   Medication Sig Dispense Refill    Continuous Blood Gluc Sensor (DEXCOM G5 MOB/G4 PLAT SENSOR) MISC Dexcom G5 Sensor to check blood sugars. Patient is to change q7days 4 each 5    blood glucose test strips (CONTOUR NEXT TEST) strip Use to check BG 4-8 times each day 500 each 2    Insulin Lispro (HUMALOG SC) Inject into the skin medtronic pump      omeprazole (PRILOSEC) 40 MG delayed release capsule Take 40 mg by mouth as needed      Riboflavin (VITAMIN B-2 PO) Take 400 mg by mouth daily      Magnesium 400 MG CAPS Take by mouth daily      pantoprazole (PROTONIX) 40 MG tablet Take 1 tablet by mouth daily for 10 days 10 tablet 0    ondansetron (ZOFRAN ODT) 4 MG disintegrating tablet Take 1 tablet by mouth every 8 hours as needed for Nausea or Vomiting 24 tablet 0    aspirin 81 MG tablet Take 81 mg by mouth daily.  pravastatin (PRAVACHOL) 40 MG tablet Take 40 mg by mouth daily.       vitamin B-1 (THIAMINE) 100 MG tablet Take 250 mg by mouth daily       B complex-vitamin C-folic acid (NEPHRO-YOCASTA) 1 MG tablet Take 1 tablet by mouth daily

## 2019-07-19 NOTE — PROGRESS NOTES
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    Chronic renal failure     Degeneration of cervical intervertebral disc     Depression     Not on medication    Diabetic peripheral neuropathy associated with type 1 diabetes mellitus ([facility] 7/19/2019    Diabetic retinopathy ([facility]     Hyperlipidemia     Hypertension     Peripheral autonomic neuropathy due to diabetes mellitus ([facility]     Type 1 diabetes mellitus (HCC)        Past Surgical History:   Procedure Laterality Date    HYSTERECTOMY, TOTAL ABDOMINAL      SHOULDER SURGERY      TRANSTHORACIC ECHOCARDIOGRAM  4/9/13    LVEF 55%    VITRECTOMY Right 2009    VITRECTOMY Left 2008       Family History   Problem Relation Age of Onset    Arthritis Mother     Arthritis Father     Heart Failure Father     Other Brother         sarcodoma    Diabetes Maternal Uncle     Diabetes Maternal Cousin     Diabetes Maternal Cousin     Diabetes Maternal Grandmother         type 2 age onset       Social History     Socioeconomic History    Marital status: Single     Spouse name: Not on file    Number of children: 0    Years of education: Not on file    Highest education level: Not on file   Occupational History    Occupation: unemployed     Comment: Disability   Social Needs    Financial resource strain: Not on file    Food insecurity:     Worry: Not on file     Inability: Not on file    Transportation needs:     Medical: Not on file     Non-medical: Not on file   Tobacco Use    Smoking status: Never Smoker    Smokeless tobacco: Never Used   Substance and Sexual Activity    Alcohol use: Not Currently     Comment: rarely    Drug use: Yes     Types: Marijuana     Comment: Medical Marijuana    Sexual activity: Not on file   Lifestyle    Physical activity:     Days per week: Not on file     Minutes per session: Not on file    Stress: Not on file   Relationships    Social connections:     Talks on phone:

## 2019-07-26 ENCOUNTER — TELEPHONE (OUTPATIENT)
Dept: NEUROLOGY | Age: 39
End: 2019-07-26

## 2019-07-29 DIAGNOSIS — E10.22 TYPE 1 DIABETES MELLITUS WITH STAGE 4 CHRONIC KIDNEY DISEASE (HCC): Primary | ICD-10-CM

## 2019-07-29 DIAGNOSIS — N18.4 TYPE 1 DIABETES MELLITUS WITH STAGE 4 CHRONIC KIDNEY DISEASE (HCC): Primary | ICD-10-CM

## 2019-08-13 ENCOUNTER — TELEPHONE (OUTPATIENT)
Dept: NEUROLOGY | Age: 39
End: 2019-08-13

## 2019-08-13 DIAGNOSIS — G31.84 MILD COGNITIVE IMPAIRMENT: ICD-10-CM

## 2019-08-13 DIAGNOSIS — N18.4 CKD (CHRONIC KIDNEY DISEASE) STAGE 4, GFR 15-29 ML/MIN (HCC): ICD-10-CM

## 2019-08-13 DIAGNOSIS — G57.93 NEUROPATHIC PAIN OF BOTH FEET: ICD-10-CM

## 2019-08-13 DIAGNOSIS — R94.131 ABNORMAL EMG: ICD-10-CM

## 2019-08-13 DIAGNOSIS — E10.42 DIABETIC PERIPHERAL NEUROPATHY ASSOCIATED WITH TYPE 1 DIABETES MELLITUS (HCC): ICD-10-CM

## 2019-08-13 DIAGNOSIS — R20.2 PARESTHESIA OF BOTH FEET: ICD-10-CM

## 2019-09-11 ENCOUNTER — TELEPHONE (OUTPATIENT)
Dept: NEUROLOGY | Age: 39
End: 2019-09-11

## 2019-11-19 ENCOUNTER — OFFICE VISIT (OUTPATIENT)
Dept: ENDOCRINOLOGY | Age: 39
End: 2019-11-19
Payer: MEDICARE

## 2019-11-19 VITALS
HEART RATE: 74 BPM | OXYGEN SATURATION: 98 % | DIASTOLIC BLOOD PRESSURE: 74 MMHG | RESPIRATION RATE: 18 BRPM | SYSTOLIC BLOOD PRESSURE: 132 MMHG | WEIGHT: 141.1 LBS | BODY MASS INDEX: 25 KG/M2 | HEIGHT: 63 IN

## 2019-11-19 DIAGNOSIS — N18.4 TYPE 1 DIABETES MELLITUS WITH STAGE 4 CHRONIC KIDNEY DISEASE (HCC): Primary | ICD-10-CM

## 2019-11-19 DIAGNOSIS — E10.22 TYPE 1 DIABETES MELLITUS WITH STAGE 4 CHRONIC KIDNEY DISEASE (HCC): Primary | ICD-10-CM

## 2019-11-19 LAB — HBA1C MFR BLD: 7.1 %

## 2019-11-19 PROCEDURE — 1036F TOBACCO NON-USER: CPT | Performed by: INTERNAL MEDICINE

## 2019-11-19 PROCEDURE — 83036 HEMOGLOBIN GLYCOSYLATED A1C: CPT | Performed by: INTERNAL MEDICINE

## 2019-11-19 PROCEDURE — 99214 OFFICE O/P EST MOD 30 MIN: CPT | Performed by: INTERNAL MEDICINE

## 2019-11-19 PROCEDURE — G8427 DOCREV CUR MEDS BY ELIG CLIN: HCPCS | Performed by: INTERNAL MEDICINE

## 2019-11-19 PROCEDURE — G8420 CALC BMI NORM PARAMETERS: HCPCS | Performed by: INTERNAL MEDICINE

## 2019-11-19 PROCEDURE — 2022F DILAT RTA XM EVC RTNOPTHY: CPT | Performed by: INTERNAL MEDICINE

## 2019-11-19 PROCEDURE — 3045F PR MOST RECENT HEMOGLOBIN A1C LEVEL 7.0-9.0%: CPT | Performed by: INTERNAL MEDICINE

## 2019-11-19 PROCEDURE — G8484 FLU IMMUNIZE NO ADMIN: HCPCS | Performed by: INTERNAL MEDICINE

## 2019-11-19 RX ORDER — LEVOCETIRIZINE DIHYDROCHLORIDE 5 MG/1
TABLET, FILM COATED ORAL
Refills: 1 | COMMUNITY
Start: 2019-10-28

## 2020-02-20 ENCOUNTER — OFFICE VISIT (OUTPATIENT)
Dept: ENDOCRINOLOGY | Age: 40
End: 2020-02-20
Payer: MEDICARE

## 2020-02-20 VITALS
OXYGEN SATURATION: 99 % | HEIGHT: 63 IN | BODY MASS INDEX: 24.8 KG/M2 | HEART RATE: 84 BPM | RESPIRATION RATE: 16 BRPM | WEIGHT: 140 LBS | DIASTOLIC BLOOD PRESSURE: 74 MMHG | SYSTOLIC BLOOD PRESSURE: 115 MMHG

## 2020-02-20 PROCEDURE — 2022F DILAT RTA XM EVC RTNOPTHY: CPT | Performed by: INTERNAL MEDICINE

## 2020-02-20 PROCEDURE — 3046F HEMOGLOBIN A1C LEVEL >9.0%: CPT | Performed by: INTERNAL MEDICINE

## 2020-02-20 PROCEDURE — 99215 OFFICE O/P EST HI 40 MIN: CPT | Performed by: INTERNAL MEDICINE

## 2020-02-20 PROCEDURE — G8427 DOCREV CUR MEDS BY ELIG CLIN: HCPCS | Performed by: INTERNAL MEDICINE

## 2020-02-20 PROCEDURE — G8420 CALC BMI NORM PARAMETERS: HCPCS | Performed by: INTERNAL MEDICINE

## 2020-02-20 PROCEDURE — G8484 FLU IMMUNIZE NO ADMIN: HCPCS | Performed by: INTERNAL MEDICINE

## 2020-02-20 PROCEDURE — 1036F TOBACCO NON-USER: CPT | Performed by: INTERNAL MEDICINE

## 2020-02-20 RX ORDER — MULTIVIT-MIN/IRON/FOLIC ACID/K 18-600-40
4000 CAPSULE ORAL DAILY
COMMUNITY
End: 2022-04-08

## 2020-02-20 RX ORDER — OLMESARTAN MEDOXOMIL 5 MG/1
5 TABLET ORAL DAILY
COMMUNITY

## 2020-02-20 NOTE — PROGRESS NOTES
her unpredictable number and timing of snacks and meals      She makes adjustments to her insulin pump herself. The last change was over 1 month ago  Assessment  - Uncontrolled  Plan   -   Cont with current insulin pump settings (see Background above)  Count carbs carefully  Try to keep to a regular routine of time of eating and carb content of meals  Eat the same food at the same time for three days to make an assessment of adequacy of pump settings    Problem 2  - Hyperglycemia  HPI   - BG varies widely througout the day (low 100's - greater than 300 mg/dl)      Secondary to unpredictable number and timing of meals and snacks  Assessment  - Worsening  Plan   - Follow the plan outlined in Problem 1 above    Problem 3  - Constipation  HPI   - Increase fiber content to 22 grams/day. Pt is gluten free  Assessment  - Uncontrolled  Plan   - Use psyllium husks or lexie seeds as a supplement    I spent >40 minutes in a face to face encounter with Abbey Blanco today.    >30 minutes of this was in education and counseling regarding dietary interventions for BG control, fiber supplements and insulin therapy    Follow up  Return to see me in 3 months      Gregory Simons MD   [facility] Endocrinology & Obesity  2/19/20

## 2020-03-10 RX ORDER — PERPHENAZINE 16 MG/1
TABLET, FILM COATED ORAL
Qty: 500 EACH | Refills: 2 | Status: SHIPPED
Start: 2020-03-10 | End: 2020-07-09 | Stop reason: SDUPTHER

## 2020-05-21 ENCOUNTER — VIRTUAL VISIT (OUTPATIENT)
Dept: ENDOCRINOLOGY | Age: 40
End: 2020-05-21
Payer: MEDICARE

## 2020-05-21 PROCEDURE — G8428 CUR MEDS NOT DOCUMENT: HCPCS | Performed by: INTERNAL MEDICINE

## 2020-05-21 PROCEDURE — 2022F DILAT RTA XM EVC RTNOPTHY: CPT | Performed by: INTERNAL MEDICINE

## 2020-05-21 PROCEDURE — 3046F HEMOGLOBIN A1C LEVEL >9.0%: CPT | Performed by: INTERNAL MEDICINE

## 2020-05-21 PROCEDURE — 99214 OFFICE O/P EST MOD 30 MIN: CPT | Performed by: INTERNAL MEDICINE

## 2020-05-21 NOTE — PROGRESS NOTES
Florentin Ann was read the following message We want to confirm that, for purposes of billing, this is a virtual visit with your provider for which we will submit a claim for reimbursement with your insurance company. You will be responsible for any copays, coinsurance amounts or other amounts not covered by your insurance company. If you do not accept this, unfortunately we will not be able to schedule a virtual visit with the provider. Do you accept?  Melany Ruano responded YES
of care      Preventive and surveillance care: up to date      HS are above target. Since her pre-D levels are good, her ICR needs tightened      Has dental abscesses and is awaiting treatment. Presently on amoxicillin for 3 weeks; now with nausea; urine keto strips are neg       Her CGM sensors are \"malfunctioning\". She does not know why. Gets an error message on her phone that states there is a sensor error and it needs to be replaced  Assessment - Uncontrolled  Plan  - Increase dinner time ICR from 1:8.5  to  1:6.5      Staff will contact her about how to perform a remote download of her CGM and insulin pump       Contact Dexcom about the sensor problem       Contact Dr. Vinny Tamayo about GI problems and amoxicillin use    Follow up   See me back in 1 month    Start time: 2:43 pm  End time:  3:19 pm    I spent 33 minutes in a face to face video encounter with Nory Allen today. >15 minutes of this was in education and counseling regarding diabetes management, correction of post-D hyperglycemia, adjustment of ICR, and her GI symptoms    An  electronic signature was used to authenticate this note. --Paula Adams MD on 5/21/2020 at 2:48 PM      Pursuant to the emergency declaration under the [facility] waiver authority and the [employer] Act, this Virtual  Visit was conducted, with patient's consent, to reduce the patient's risk of exposure to COVID-19 and provide continuity of care for an established patient. Services were provided through a video synchronous discussion virtually to substitute for in-person clinic visit.

## 2020-05-21 NOTE — PATIENT INSTRUCTIONS
Increase dinner time ICR from 1:8.5  to  1:6.5  Staff will contact you about how to perform a remote download of her CGM and insulin pump  Contact Dexcom about the sensor problem  Contact Dr. Joel Klein about GI problems and amoxicillin use    See me back in one month

## 2020-07-09 ENCOUNTER — VIRTUAL VISIT (OUTPATIENT)
Dept: ENDOCRINOLOGY | Age: 40
End: 2020-07-09
Payer: MEDICARE

## 2020-07-09 PROCEDURE — 99443 PR PHYS/QHP TELEPHONE EVALUATION 21-30 MIN: CPT | Performed by: INTERNAL MEDICINE

## 2020-07-09 RX ORDER — PERPHENAZINE 16 MG/1
TABLET, FILM COATED ORAL
Qty: 500 EACH | Refills: 2 | Status: SHIPPED
Start: 2020-07-09 | End: 2021-06-28

## 2020-07-09 NOTE — PROGRESS NOTES
Marcelo Rutherford is a 46 y.o. female evaluated via telephone on 7/9/2020. Consent:  She and/or health care decision maker is aware that that she may receive a bill for this telephone service, depending on her insurance coverage, and has provided verbal consent to proceed: Yes      Documentation:  I communicated with the patient and/or health care decision maker about T1DM. Details of this discussion including any medical advice provided are as follows:      BACKGROUND:  Last visit:  5/21/20  Initial visit: 2/19/19    T1DM:  Diagnosed at age 10 (1)  Former pt of Dr. Carmita Box and Dr. Oliver Archibald started 1/20/19   Medtronic 630G (lispro) started 8/2017    A1c: 7.4% 2/27/20, 7.1% 11/19/19  BG: Using her CGM, but unable to download it today ([facility] fasting low 100's over the past couple of days  Hypoglycemia: I asked but was unable to get her to give me a freq, occur 4-5 hours after she eats,  + good awareness  Regimen: Humalog through her Medtronic 446 pump  Complications: CKD, DR  Eyes:       10/2019, +DR b/l, blindness in Left Eye (complication of vitrectomy)  Kidneys:   BUN/Cr  6/02/20 27/2.55, GFR 21;  followed by Dr. Marilyn Shah every 3 months                                  2/27/20 37/2.76, GFR 19;                                   5/07/19, 36/2.67, GFR 20  U ACR:                     2/27/20 prot/cr 0.6 (<0.2 is normal)                                 5/07/19, prot/cr 0.6   Feet -       11/19/19 Decreased monofilament sensation b/l (right decrease is more than left) no calluses, ulcers or red spots  BP -         HTN, Controlled with Benicar 5 mg/daily  Statin:      statin intolerant  ARB:        Benicar 5 mg day  Diet:         No uniformity to timing and amount of carb intake                States her life has been in upheaval due to her moving to a new house      [facility] [address]  Medical problems: T1DM, HTN, HLD, DR with left eye blindness, Depression;  Asthma  Soc Hx: no tob, no etoh, +MJ (medical)  Medications:Lispro, Olmesartan, Omeprazole, Amoxicillin (for dental infections), Pravastatin    ROS:   Gen: no fever  Pulm: no cough  ENT: tooth abscess resolved (tooth had to be pulled)    PHYSICAL EXAMINATION:  Not performed (telephone encounter)       HISTORY & ASSESSMENT/PLAN:    Problem 1 - T1DM  HPI  - See above for summary of care      Preventive and surveillance care: up to date (a1c checked yest and is pending)      Issues with the CGM sensors and transmitter have corrected. The limiting factor for her glycemic control is her diet/carb intake. I am unable to steer her b/c she has her own opinions about what her problems are and what she can and cant change. Also she is restricted to a gluten free diet. No changes can be made to her insulin pump settings because of having no data. Assessment - Uncontrolled  Plan  - Pt to cont to work with staff to complete CGM and insulin pump downloads      Cont present pump settings    Follow up   See me back in 4-6 weeks      I affirm this is a Patient Initiated Episode with a Patient who has not had a related appointment within my department in the past 7 days or scheduled within the next 24 hours.     Patient identification was verified at the start of the visit: Yes    Start time: 4:38 pm  End time:  5:08 pm  Total time:   30 min    Total Time: minutes: 21-30 minutes    Note: not billable if this call serves to triage the patient into an appointment for the relevant concern      Elizabeth Suárez MD  Endocrinology/Obesity  7/9/20

## 2020-07-09 NOTE — PATIENT INSTRUCTIONS
Pt to cont to work with staff to complete CGM and insulin pump downloads  Cont present pump settings     Follow up   See me back in 4-6 weeks     Start time: 4:38 pm  End time:  5:00 pm

## 2020-07-10 NOTE — PROGRESS NOTES
Josiah Longo was read the following message We want to confirm that, for purposes of billing, this is a virtual visit with your provider for which we will submit a claim for reimbursement with your insurance company. You will be responsible for any copays, coinsurance amounts or other amounts not covered by your insurance company. If you do not accept this, unfortunately we will not be able to schedule a virtual visit with the provider. Do you accept?  Areaa Press responded YES

## 2020-09-11 NOTE — PROGRESS NOTES
Israel Valenzuela was read the following message We want to confirm that, for purposes of billing, this is a virtual visit with your provider for which we will submit a claim for reimbursement with your insurance company. You will be responsible for any copays, coinsurance amounts or other amounts not covered by your insurance company. If you do not accept this, unfortunately we will not be able to schedule a virtual visit with the provider. Do you accept?  Angel Rodriguez responded yes

## 2020-09-15 ENCOUNTER — OFFICE VISIT (OUTPATIENT)
Dept: ENDOCRINOLOGY | Age: 40
End: 2020-09-15
Payer: MEDICARE

## 2020-09-15 VITALS — TEMPERATURE: 97.9 F | HEIGHT: 62 IN | WEIGHT: 141.8 LBS | BODY MASS INDEX: 26.09 KG/M2

## 2020-09-15 LAB — HBA1C MFR BLD: 7.4 %

## 2020-09-15 PROCEDURE — 2022F DILAT RTA XM EVC RTNOPTHY: CPT | Performed by: INTERNAL MEDICINE

## 2020-09-15 PROCEDURE — 3051F HG A1C>EQUAL 7.0%<8.0%: CPT | Performed by: INTERNAL MEDICINE

## 2020-09-15 PROCEDURE — G8419 CALC BMI OUT NRM PARAM NOF/U: HCPCS | Performed by: INTERNAL MEDICINE

## 2020-09-15 PROCEDURE — 83036 HEMOGLOBIN GLYCOSYLATED A1C: CPT | Performed by: INTERNAL MEDICINE

## 2020-09-15 PROCEDURE — G8428 CUR MEDS NOT DOCUMENT: HCPCS | Performed by: INTERNAL MEDICINE

## 2020-09-15 PROCEDURE — 1036F TOBACCO NON-USER: CPT | Performed by: INTERNAL MEDICINE

## 2020-09-15 PROCEDURE — 99215 OFFICE O/P EST HI 40 MIN: CPT | Performed by: INTERNAL MEDICINE

## 2020-09-15 NOTE — PROGRESS NOTES
CC:  T1DM    BACKGROUND:  Last visit:  5/21/20  Initial visit: 2/19/19    T1DM:  Diagnosed at age 10 (1)  Former pt of Dr. Becky Lloyd and Dr. La Hall started 1/20/19   Medtronic 630G (lispro) started 8/2017    A1c: 7.4% 9/15/20, 7.4% 2/27/20, 7.1% 11/19/19  BG:  Dexcom download 8/20-9/15; 55% in target range, 1% below, 26% high, 17% very high; usually high 9pm-11am, usually in target range 11am to 9pm  Hypoglycemia: 2-7x/week <70 mg/dl, no specific times of occurence  Regimen: Humalog through her Medtronic 296 pump  Complications: CKD, DR  Eyes:       10/2019, +DR b/l, blindness in Left Eye (complication of vitrectomy)  Kidneys:   BUN/Cr  6/02/20 27/2.55, GFR 21;  followed by Dr. Elisa Amato every 3 months                                  2/27/2037/2.76, GFR 19;                                   5/07/19, 36/2.67, GFR 20  U ACR:                    2/27/20 prot/cr 0.6 (<0.2 is normal)                                5/07/19, prot/cr 0.6   Feet -       9/15/20 no decrease in monofilament sensation (stated in 11/2019 that there was some decrease b/l) no calluses, ulcers or red spots. +onychomyosis b/l great toes; both 5th digits are raised upward and medially  BP -         HTN, Controlled with Benicar 5 mg/daily  Statin:      statin intolerant  ARB:        Benicar 5 mg day  Diet:         No uniformity to timing and amount of carb intake                States her life has been in upheaval due to her moving to a new house [facility]:  Medical problems: T1DM, HTN, HLD, DR with left eye blindness, Depression;  Asthma  Soc Hx: no tob, no etoh, +MJ (medical)  Medications: Lispro, Olmesartan, Omeprazole    ROS:   Gen: no fever  Pulm: no cough  Gen: + abd discomfort    PHYSICAL EXAMINATION:  Gen:Temp 97.9 °F (36.6 °C)   Ht 5' 2\" (1.575 m)   Wt 141 lb 12.8 oz (64.3 kg)   LMP 05/21/2014   BMI 25.94 kg/m²   WN, WD, Thin, NAD  Pulm:   no resp distress  Neuro: moves all extremities well  Psych: full affect, normal mood  Feet:    no decrease in monofilament sensation (stated in 11/2019 that there was some decrease b/l) no calluses, ulcers or red spots. +onychomyosis b/l great toes; both 5th digits are raised upward and medially    HISTORY & ASSESSMENT/PLAN:    Problem 1 - T1DM  HPI  - See above for summary of care      Preventive and surveillance care: needs eye exam in one month      The limiting factor for her glycemic control is her diet/carb intake (unpredictable in timing and in amount)      Based on her CGM, her 10pm-6am basal rates need to be increased. Since she eats dinner 8-9pm, it may be that the ICR for this time period needs to be adjusted as well. Assessment - Uncontrolled  Plan  -   Build a personal carb-controlled menu that is uniform in time and amount of carb intake  Create three meal options for breakfast and make each have 30-35 grams of carbs  Create three meal options for lunch and make each have 35-40 grams of carbs  Create three meal options for dinner and make each have 55-60 grams of carbs    Eat each of the meals within the same 1 hour time period (for example, eat breakfast each day 7:30 am - 8:30 pm.)    Increase the basal rates of the 10pm to 8am period by 0.05 units per hour    Therefore the new pump settings are:  Basal rate  00:00 0.600 units/hr  08:00 0.550  13:00   0.475  18:00   0.600  22:00   0.675    ICR  00:00 6.5    Sens  00:00 45    Target Range  00:00       Follow up   See me back in 6 weeks    I spent 40 minutes in a face to face encounter with Karen Vincent today.    >15 minutes of this was in education and counseling regarding dietary interventions for glycemic control, diabetes management, insulin therapy, interpretation and discussion of CGM data and hypoglycemia prevention      Farhad Mello MD  Endocrinology/Obesity  9/15/20

## 2020-09-18 ENCOUNTER — TELEPHONE (OUTPATIENT)
Dept: ENDOCRINOLOGY | Age: 40
End: 2020-09-18

## 2020-10-09 ENCOUNTER — TELEPHONE (OUTPATIENT)
Dept: ENDOCRINOLOGY | Age: 40
End: 2020-10-09

## 2020-11-18 ENCOUNTER — TELEPHONE (OUTPATIENT)
Dept: ENDOCRINOLOGY | Age: 40
End: 2020-11-18

## 2020-11-18 NOTE — TELEPHONE ENCOUNTER
Attached is the pt's Dexcom download. I tried to download her Medtronic, but there was a software malfunction, and the pt had to leave.

## 2020-11-29 ENCOUNTER — TELEPHONE (OUTPATIENT)
Dept: ENDOCRINOLOGY | Age: 40
End: 2020-11-29

## 2020-12-11 ENCOUNTER — TELEPHONE (OUTPATIENT)
Dept: ENDOCRINOLOGY | Age: 40
End: 2020-12-11

## 2020-12-11 NOTE — TELEPHONE ENCOUNTER
Pt called in and said she needs to be seen asap for her pump. She missed her last appointment and her 3 months that she needed seen by was at the beginning of this month. Please call pt to discuss.  Thanks

## 2021-02-09 ENCOUNTER — OFFICE VISIT (OUTPATIENT)
Dept: ENDOCRINOLOGY | Age: 41
End: 2021-02-09
Payer: MEDICARE

## 2021-02-09 VITALS
SYSTOLIC BLOOD PRESSURE: 118 MMHG | DIASTOLIC BLOOD PRESSURE: 78 MMHG | TEMPERATURE: 97.7 F | OXYGEN SATURATION: 99 % | HEIGHT: 62 IN | WEIGHT: 141 LBS | HEART RATE: 98 BPM | BODY MASS INDEX: 25.95 KG/M2

## 2021-02-09 DIAGNOSIS — E10.22 TYPE 1 DIABETES MELLITUS WITH STAGE 4 CHRONIC KIDNEY DISEASE (HCC): Primary | ICD-10-CM

## 2021-02-09 DIAGNOSIS — N18.4 TYPE 1 DIABETES MELLITUS WITH STAGE 4 CHRONIC KIDNEY DISEASE (HCC): Primary | ICD-10-CM

## 2021-02-09 LAB — HBA1C MFR BLD: 7.8 %

## 2021-02-09 PROCEDURE — G8419 CALC BMI OUT NRM PARAM NOF/U: HCPCS | Performed by: INTERNAL MEDICINE

## 2021-02-09 PROCEDURE — 2022F DILAT RTA XM EVC RTNOPTHY: CPT | Performed by: INTERNAL MEDICINE

## 2021-02-09 PROCEDURE — 3051F HG A1C>EQUAL 7.0%<8.0%: CPT | Performed by: INTERNAL MEDICINE

## 2021-02-09 PROCEDURE — 99214 OFFICE O/P EST MOD 30 MIN: CPT | Performed by: INTERNAL MEDICINE

## 2021-02-09 PROCEDURE — G8484 FLU IMMUNIZE NO ADMIN: HCPCS | Performed by: INTERNAL MEDICINE

## 2021-02-09 PROCEDURE — 1036F TOBACCO NON-USER: CPT | Performed by: INTERNAL MEDICINE

## 2021-02-09 PROCEDURE — 83036 HEMOGLOBIN GLYCOSYLATED A1C: CPT | Performed by: INTERNAL MEDICINE

## 2021-02-09 PROCEDURE — G8427 DOCREV CUR MEDS BY ELIG CLIN: HCPCS | Performed by: INTERNAL MEDICINE

## 2021-02-09 NOTE — PATIENT INSTRUCTIONS
Build a personal carb-controlled menu that is uniform in time and amount of carb intake  Create three meal options for breakfast and make each have 30-35 grams of carbs  Create three meal options for lunch and make each have 35-40 grams of carbs  Create three meal options for dinner and make each have 55-60 grams of carbs    Eat each of the meals within the same 1 hour time period (for example, eat breakfast each day 7:30 am - 8:30 pm.)    The new pump settings are:  Basal rate  00:00 0.650 units/hr  03:00 0.600  07:00   0.500  13:00   0.500  22:00   0.700    ICR  00:00 5.0  06:00  10.0  11:00    5.0  16:00    5.0    Sens  00:00 60    Target Range  00:00       Send me a download in one week    Follow up   See me back in 2-3 months

## 2021-02-09 NOTE — PROGRESS NOTES
CC:  T1DM    BACKGROUND:  Last visit:  9/15/20  Initial visit: 2/19/19    · T1DM  Diagnosed at age 10 (1)  Former pt of Dr. Laura Jackson and Dr. Mary Zepeda started 1/20/19   Medtronic 630G (lispro) started 8/2017    A1c: 7.8 % 2/9/21, 7.4% 9/15/20, 7.4% 2/27/20  BG:  Dexcom download 1/11/21-2/9/21; 55% in target range, 2% below, 22% high, 25% very high; highest 6p-8p, greatest time in target range is 12a-9p  Hypoglycemia: 1-2x/wk, lowest 39, most are mild, usually 6a-9a  Regimen: Humalog through her Medtronic 630 pump; makes changes to the settings on her own  Complications: CKD, DR  Eyes:       10/2020, +DR b/l, blindness in Left Eye (complication of vitrectomy)  Kidneys:   BUN/Cr  6/02/20 27/2.55, GFR 21;  followed by Dr. Ron Sever every 3 months                                  2/27/20 37/2.76, GFR 19;                                   5/07/19 36/2.67, GFR 20  U ACR:                    2/27/20 prot/cr 0.6 (<0.2 is normal)                                5/07/19, prot/cr 0.6   Feet -       9/15/20 no decrease in monofilament sensation (stated in 11/2019 that there was some decrease b/l) no calluses, ulcers or red spots. +onychomyosis b/l great toes; both 5th digits are raised upward and medially  BP -         HTN, Controlled with Benicar 5 mg/daily  Statin:      statin intolerant  ARB:        Benicar 5 mg day  Diet:         No uniformity to timing and amount of carb intake, appetite varies greatly    PFSH:  Medical problems: T1DM, HTN, HLD, DR with left eye blindness, Depression; Asthma  Soc Hx: no tob, no etoh, +MJ (medical)  Current Outpatient Medications   Medication Sig Dispense Refill    medical marijuana Take 2 mg by mouth Twice a Week.       blood glucose test strips (CONTOUR NEXT TEST) strip Use contour next test strips to check BG 4-8 times each day with medtronic insulin pump 500 each 2    insulin lispro (HUMALOG) 100 UNIT/ML injection vial 100 units/day via insulin pump 3 vial 3    olmesartan (BENICAR) 5 MG tablet Take 5 mg by mouth daily      Cholecalciferol (VITAMIN D) 50 MCG (2000 UT) CAPS capsule Take 4,000 Units by mouth daily      Ferrous Gluconate (IRON 27 PO) Take 25 mg by mouth three times a week      levocetirizine (XYZAL) 5 MG tablet TAKE ONE TABLET BY MOUTH EVERY DAY  1    Continuous Blood Gluc Sensor (DEXCOM G5 MOB/G4 PLAT SENSOR) MISC Dexcom G5 Sensor to check blood sugars. Patient is to change q7days 4 each 5    omeprazole (PRILOSEC) 40 MG delayed release capsule Take 40 mg by mouth as needed      Riboflavin (VITAMIN B-2 PO) Take 400 mg by mouth daily      Magnesium 400 MG CAPS Take by mouth daily      ondansetron (ZOFRAN ODT) 4 MG disintegrating tablet Take 1 tablet by mouth every 8 hours as needed for Nausea or Vomiting 24 tablet 0    vitamin B-1 (THIAMINE) 100 MG tablet Take 250 mg by mouth daily       acetaminophen (TYLENOL) 500 MG tablet Take 500 mg by mouth every 6 hours as needed for Pain.  B complex-vitamin C-folic acid (NEPHRO-YOCASTA) 1 MG tablet Take 1 tablet by mouth daily (with breakfast). No current facility-administered medications for this visit.       ROS:   Gen: no fever  Pulm: no cough    PHYSICAL EXAMINATION:  Gen:/78   Pulse 98   Temp 97.7 °F (36.5 °C) (Temporal)   Ht 5' 2\" (1.575 m)   Wt 141 lb (64 kg)   LMP 05/21/2014   SpO2 99%   BMI 25.79 kg/m²   WN, WD, Thin, NAD  Pulm:   no resp distress  Neuro: moves all extremities well  Psych: full affect, normal mood    HISTORY & ASSESSMENT/PLAN:    Problem 1 - T1DM  HPI  - See above for summary of care      Preventive and surveillance care: needs eye exam in one month      The limiting factor for her glycemic control remains her diet/carb intake (unpredictable in timing and in amount)      Based on her CGM, her 12am-9am basal rate needs to be reduced and her ICR ratio for the evening hours needs to be tighter      She wants to switch to a T-Slim pump (warranty for her Medtronic pump is up in July) Assessment - Uncontrolled  Plan  -   Build a personal carb-controlled menu that is uniform in time and amount of carb intake  Create three meal options for breakfast and make each have 30-35 grams of carbs  Create three meal options for lunch and make each have 35-40 grams of carbs  Create three meal options for dinner and make each have 55-60 grams of carbs    Eat each of the meals within the same 1 hour time period (for example, eat breakfast each day 7:30 am - 8:30 pm.)    Change the pump settings to the following:  Basal rate  00:00 0.650 units/hr  03:00 0.600  07:00   0.500  13:00   0.500  22:00   0.700    ICR  00:00 5.0  06:00  10.0  11:00    5.0  16:00    5.0    Sens  00:00 60    Target Range  00:00       Send me a download in one week    Follow up   See me back in 2-3 months    Total time spent on encounter: 35 min      Evi Reynoso MD  Endocrinology/Obesity  2/9/21

## 2021-03-31 ENCOUNTER — HOSPITAL ENCOUNTER (OUTPATIENT)
Dept: GENERAL RADIOLOGY | Age: 41
Discharge: HOME | End: 2021-04-02
Payer: MEDICARE

## 2021-03-31 DIAGNOSIS — N64.4 BREAST PAIN IN FEMALE: ICD-10-CM

## 2021-03-31 DIAGNOSIS — R92.8 ABNORMAL MAMMOGRAM: ICD-10-CM

## 2021-03-31 PROCEDURE — G0279 TOMOSYNTHESIS, MAMMO: HCPCS

## 2021-03-31 PROCEDURE — 76642 ULTRASOUND BREAST LIMITED: CPT

## 2021-04-06 ENCOUNTER — HOSPITAL ENCOUNTER (OUTPATIENT)
Dept: GENERAL RADIOLOGY | Age: 41
Discharge: HOME | End: 2021-04-08
Payer: MEDICARE

## 2021-04-06 DIAGNOSIS — R92.8 ABNORMAL MAMMOGRAM: ICD-10-CM

## 2021-04-06 PROCEDURE — 77065 DX MAMMO INCL CAD UNI: CPT

## 2021-04-06 PROCEDURE — 88305 TISSUE EXAM BY PATHOLOGIST: CPT

## 2021-04-06 PROCEDURE — 19083 BX BREAST 1ST LESION US IMAG: CPT

## 2021-04-06 PROCEDURE — 2500000003 HC RX 250 WO HCPCS

## 2021-04-06 NOTE — PROGRESS NOTES
Met with patient prior to her breast biopsy. Instructed on  breast biopsy procedure. Denies use of blood thinners or aspirin products within the past 5 days. She indicates that she is diabetic and in renal failure. States she has chronic right shoulder pain. I remained with her during the biopsy. She tolerated biopsy well. Upon questioning regarding results notification, patient indicates that she would like to receive breast biopsy results by phone via the breast navigator. Instructed that results will be available in approximately 3-5 business days. Instructed that her physician will also be notified of results. Provided with folder containing my contact information, monthly breast self exam card, and post biopsy discharge instructions. Instructed to call me if she has any questions or concerns about her biopsy. Verbalizes understanding.  LUKASZ Arce, OCN, CN-BN

## 2021-04-12 ENCOUNTER — TELEPHONE (OUTPATIENT)
Dept: GENERAL RADIOLOGY | Age: 41
End: 2021-04-12

## 2021-04-12 NOTE — TELEPHONE ENCOUNTER
Left voicemail message for patient that the pathology report from her recent right breast biopsy indicates a benign finding: however, Dr. Claudia Stein needs to review the results for concordance. Instructed that I will call her once this is done (next week). Left my contact information to call, if she has questions.  Electronically signed by Olena Arevalo RN, BSN on 4/12/2021 at 11:44 AM

## 2021-04-20 ENCOUNTER — TELEPHONE (OUTPATIENT)
Dept: GENERAL RADIOLOGY | Age: 41
End: 2021-04-20

## 2021-04-20 NOTE — TELEPHONE ENCOUNTER
Informed Lucina that [facility] has indicated that her breast biopsy results are discordant with imaging findings. A breast MRI (with contrast) was initially recommended to further evaluate the area, but due to Lucina's renal failure (GFR 17),  MRI is not an option. Instructed that options include surgical consultation for possible surgical biopsy or MBI. She states she would like to consult with her nephrologist- Dr. Armando Sandhoff to see what he would recommend, since MBI would require contrast (Sestamibi) and surgery would require anesthesia. Results and recommendations were faxed to the ordering provider and to Dr. Armando Sandhoff per Lucina's request.  She will discuss with her healthcare providers and let me know how she would like to proceed.  Electronically signed by Alena New RN, BSN on 4/20/2021 at 2:43 PM

## 2021-04-26 ENCOUNTER — OFFICE VISIT (OUTPATIENT)
Dept: ENDOCRINOLOGY | Age: 41
End: 2021-04-26
Payer: MEDICARE

## 2021-04-26 VITALS
BODY MASS INDEX: 25.54 KG/M2 | HEIGHT: 62 IN | SYSTOLIC BLOOD PRESSURE: 118 MMHG | WEIGHT: 138.8 LBS | DIASTOLIC BLOOD PRESSURE: 78 MMHG | HEART RATE: 96 BPM

## 2021-04-26 DIAGNOSIS — E10.22 TYPE 1 DIABETES MELLITUS WITH STAGE 4 CHRONIC KIDNEY DISEASE (HCC): Primary | ICD-10-CM

## 2021-04-26 DIAGNOSIS — N18.4 TYPE 1 DIABETES MELLITUS WITH STAGE 4 CHRONIC KIDNEY DISEASE (HCC): Primary | ICD-10-CM

## 2021-04-26 PROCEDURE — 1036F TOBACCO NON-USER: CPT | Performed by: INTERNAL MEDICINE

## 2021-04-26 PROCEDURE — 99214 OFFICE O/P EST MOD 30 MIN: CPT | Performed by: INTERNAL MEDICINE

## 2021-04-26 PROCEDURE — 3051F HG A1C>EQUAL 7.0%<8.0%: CPT | Performed by: INTERNAL MEDICINE

## 2021-04-26 PROCEDURE — 2022F DILAT RTA XM EVC RTNOPTHY: CPT | Performed by: INTERNAL MEDICINE

## 2021-04-26 PROCEDURE — G8419 CALC BMI OUT NRM PARAM NOF/U: HCPCS | Performed by: INTERNAL MEDICINE

## 2021-04-26 PROCEDURE — G8428 CUR MEDS NOT DOCUMENT: HCPCS | Performed by: INTERNAL MEDICINE

## 2021-04-26 NOTE — PATIENT INSTRUCTIONS
Build a personal carb-controlled menu that is uniform in time and amount of carb intake  Create three meal options for breakfast and make each have 30-35 grams of carbs  Create three meal options for lunch and make each have 35-40 grams of carbs  Create three meal options for dinner and make each have 55-60 grams of carbs    Eat each of the meals within the same 1 hour time period (for example, eat breakfast each day 7:30 am - 8:30 pm.)    Follow up   See me back in 3 months  Send pump downloads anytime she wants input    Total time spent on the encounter: 30 min

## 2021-04-26 NOTE — PROGRESS NOTES
CC:  T1DM    BACKGROUND:  Last visit:  2/09/21  Initial visit: 2/19/19    · T1DM  Diagnosed at age 10 (1)  Former pt of Dr. Ross Dougherty and Dr. Juan Ramon Pires started 1/20/19   Medtronic 630G (lispro) started 8/2017  A1c: 7.8 % 2/9/21, 7.4% 9/15/20, 7.4% 2/27/20  BG:  Insulin pump downloaded; has been using Dexcom for only the past two weeks (she has not been using it for 6-9 weeks b/c of being tired of the alarms and inconvenience)    Hypoglycemia: 3-5d/wk, times vary  Regimen: Humalog through her Medtronic 630 pump; makes changes to the settings on her own  Complications: CKD, DR  Eyes:       02/09/21, +DR right eye without macular edema, blindness in Left Eye (complication of vitrectomy)  Kidneys:   BUN/Cr  3/04/21 33/2.95, GFR 18,  followed by Dr. Sheryl Bernal every 3 months                                6/02/20 27/2.55, GFR 21                                  2/27/20 37/2.76, GFR 19;                                   5/07/19 36/2.67, GFR 20  U ACR:                    2/27/20 prot/cr 0.6 (<0.2 is normal)                                5/07/19, prot/cr 0.6   Feet -       9/15/20 no decrease in monofilament sensation (stated in 11/2019 that there was some decrease b/l) no calluses, ulcers or red spots. 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       blood glucose test strips (CONTOUR NEXT TEST) strip Use contour next test strips to check BG 4-8 times each day with medtronic insulin pump 500 each 2    insulin lispro (HUMALOG) 100 UNIT/ML injection vial 100 units/day via insulin pump 3 vial 3    olmesartan (BENICAR) 5 MG tablet Take 5 mg by mouth daily      Cholecalciferol (VITAMIN D) 50 MCG (2000 UT) CAPS capsule Take 4,000 Units by mouth daily      Ferrous Gluconate (IRON 27 PO) Take 25 mg by mouth three times a week      levocetirizine (XYZAL) 5 MG tablet TAKE ONE TABLET BY MOUTH EVERY DAY  1    Continuous Blood Gluc Sensor (DEXCOM G5 MOB/G4 PLAT SENSOR) MISC Dexcom G5 Sensor to check blood sugars. Patient is to change q7days 4 each 5    omeprazole (PRILOSEC) 40 MG delayed release capsule Take 40 mg by mouth as needed      Riboflavin (VITAMIN B-2 PO) Take 400 mg by mouth daily      Magnesium 400 MG CAPS Take by mouth daily      ondansetron (ZOFRAN ODT) 4 MG disintegrating tablet Take 1 tablet by mouth every 8 hours as needed for Nausea or Vomiting 24 tablet 0    vitamin B-1 (THIAMINE) 100 MG tablet Take 250 mg by mouth daily       B complex-vitamin C-folic acid (NEPHRO-YOCASTA) 1 MG tablet Take 1 tablet by mouth daily (with breakfast).  acetaminophen (TYLENOL) 500 MG tablet Take 500 mg by mouth every 6 hours as needed for Pain. No current facility-administered medications for this visit.       ROS:   Gen: no fever  Pulm: no cough    PHYSICAL EXAMINATION:  Gen:/[id number] (Site: Left Upper Arm, Position: Sitting, Cuff Size: Medium Adult)   Pulse 96   Ht 5' 2\" (1.575 m)   Wt 138 lb 12.8 oz (63 kg)   LMP 05/21/2014   BMI 25.39 kg/m²   WN, WD, Thin, NAD  Pulm:   no resp distress  Neuro: moves all extremities well  Psych: full affect, normal mood    HISTORY & ASSESSMENT/PLAN:    Problem 1 - T1DM  HPI  - See above for summary of care      Preventive and surveillance care: needs eye exam in one month      The limiting factor for her glycemic control continues to be her diet/carb intake (unpredictable in timing and in amount)      Readings are too erratic to allow for an adjustment to her rates; this is due to the day to day variation in her activity and in the freq and amount of her carb intake      Still wants a T-Slim. Has not spoken with the rep yet, though has tried to contact him/her.   Assessment - Uncontrolled; cont to work toward full-implementation of dietary interventions  Plan  -   Build a personal carb-controlled menu that is uniform in time and amount of carb intake  Create three meal options for breakfast and make each have 30-35 grams of carbs  Create three meal options for lunch and make each have 35-40 grams of carbs  Create three meal options for dinner and make each have 55-60 grams of carbs    Eat each of the meals within the same 1 hour time period (for example, eat breakfast each day 7:30 am - 8:30 pm.)    Follow up   See me back in 3 months  Pt instructed to send pump downloads anytime she wants my input    Total time spent on the encounter: 30 min      Felix Butler MD  Endocrinology/Obesity  4/26/21

## 2021-04-28 ENCOUNTER — TELEPHONE (OUTPATIENT)
Dept: CASE MANAGEMENT | Age: 41
End: 2021-04-28

## 2021-04-28 ENCOUNTER — TELEPHONE (OUTPATIENT)
Dept: GENERAL RADIOLOGY | Age: 41
End: 2021-04-28

## 2021-04-28 NOTE — TELEPHONE ENCOUNTER
Patient has Medicare/Medicaid- no prior authorization required for molecular breast imaging. Left voicemail message for Lucina to call me re: scheduling MBI.  Electronically signed by Pamela Carr RN, BSN on 4/28/2021 at 10:42 AM

## 2021-05-05 ENCOUNTER — CLINICAL DOCUMENTATION (OUTPATIENT)
Dept: GENERAL RADIOLOGY | Age: 41
End: 2021-05-05

## 2021-05-14 ENCOUNTER — CLINICAL DOCUMENTATION (OUTPATIENT)
Dept: GENERAL RADIOLOGY | Age: 41
End: 2021-05-14

## 2021-05-14 NOTE — PROGRESS NOTES
Rai did not show for [facility] 5/13/21.   Electronically signed by Jeffrey Ansari RN, BSN on 5/14/2021 at 9:34 AM

## 2021-06-16 DIAGNOSIS — Z96.41 INSULIN PUMP IN PLACE: ICD-10-CM

## 2021-06-16 DIAGNOSIS — E10.22 TYPE 1 DIABETES MELLITUS WITH STAGE 4 CHRONIC KIDNEY DISEASE (HCC): ICD-10-CM

## 2021-06-16 DIAGNOSIS — N18.4 TYPE 1 DIABETES MELLITUS WITH STAGE 4 CHRONIC KIDNEY DISEASE (HCC): ICD-10-CM

## 2021-06-23 ENCOUNTER — TELEPHONE (OUTPATIENT)
Dept: GENERAL RADIOLOGY | Age: 41
End: 2021-06-23

## 2021-06-28 ENCOUNTER — OFFICE VISIT (OUTPATIENT)
Dept: ORTHOPEDIC SURGERY | Age: 41
End: 2021-06-28
Payer: MEDICARE

## 2021-06-28 VITALS — HEIGHT: 63 IN | WEIGHT: 135 LBS | BODY MASS INDEX: 23.92 KG/M2

## 2021-06-28 DIAGNOSIS — E10.618 ADHESIVE CAPSULITIS OF RIGHT SHOULDER ASSOCIATED WITH TYPE 1 DIABETES MELLITUS (HCC): Primary | ICD-10-CM

## 2021-06-28 DIAGNOSIS — N18.9 CHRONIC RENAL FAILURE, UNSPECIFIED CKD STAGE: ICD-10-CM

## 2021-06-28 DIAGNOSIS — M75.01 ADHESIVE CAPSULITIS OF RIGHT SHOULDER ASSOCIATED WITH TYPE 1 DIABETES MELLITUS (HCC): Primary | ICD-10-CM

## 2021-06-28 PROCEDURE — 99204 OFFICE O/P NEW MOD 45 MIN: CPT | Performed by: ORTHOPAEDIC SURGERY

## 2021-06-28 PROCEDURE — 1036F TOBACCO NON-USER: CPT | Performed by: ORTHOPAEDIC SURGERY

## 2021-06-28 PROCEDURE — 2022F DILAT RTA XM EVC RTNOPTHY: CPT | Performed by: ORTHOPAEDIC SURGERY

## 2021-06-28 PROCEDURE — 3051F HG A1C>EQUAL 7.0%<8.0%: CPT | Performed by: ORTHOPAEDIC SURGERY

## 2021-06-28 PROCEDURE — G8420 CALC BMI NORM PARAMETERS: HCPCS | Performed by: ORTHOPAEDIC SURGERY

## 2021-06-28 PROCEDURE — G8427 DOCREV CUR MEDS BY ELIG CLIN: HCPCS | Performed by: ORTHOPAEDIC SURGERY

## 2021-06-28 NOTE — PROGRESS NOTES
Chief Complaint:   Chief Complaint   Patient presents with    Shoulder Pain     Pain in right shoulder since Feb. 2020. Denies injury. Pain has worsened in the last 6 months. Does alot of physical outside work, has animals, stopped lifting feed bags. MRI results at [facility] [place], did not receive disc. HPI 45-year-old woman with at least several months of right shoulder pain and stiffness. Was being treated by orthopedic surgeon in [place]. Prescribed physical therapy which increased her pain which patient thinks is due to excessive aggressiveness with therapy. She has a history of left shoulder pain and stiffness that went on for a number of months was not improved with arthroscopic surgery. Patient has end-stage kidney disease, is diabetic, is reluctant to have steroid injections. She had an MRI of the right shoulder which showed partial-thickness tendon tears.     Patient Active Problem List   Diagnosis    Type 1 diabetes mellitus with stage 4 chronic kidney disease (HCC)    Type 1 diabetes mellitus ([address]    Diabetic peripheral neuropathy associated with type 1 diabetes mellitus (HCC)    Chronic renal failure    Adhesive capsulitis of right shoulder associated with type 1 diabetes mellitus ([address]       Past Medical 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    Chronic renal failure     Degeneration of cervical intervertebral disc     Depression     Not on medication    Diabetic peripheral neuropathy associated with type 1 diabetes mellitus ([address] 7/19/2019    Diabetic retinopathy ([address]     Hyperlipidemia     Hypertension     Peripheral autonomic neuropathy due to diabetes mellitus (HCC)     Type 1 diabetes mellitus ([address]        Past Surgical History:   Procedure Laterality Date    HYSTERECTOMY, TOTAL ABDOMINAL      SHOULDER SURGERY      TRANSTHORACIC ECHOCARDIOGRAM  4/9/13    LVEF 55%    US BREAST NEEDLE BIOPSY status: Never Smoker    Smokeless tobacco: Never Used   Vaping Use    Vaping Use: Never used   Substance and Sexual Activity    Alcohol use: Not Currently     Comment: rarely    Drug use: Yes     Types: Marijuana     Comment: Medical Marijuana    Sexual activity: None   Other Topics Concern    None   Social History Narrative    Lives in [place] with boyfriend in a house     Social Determinants of Health     Financial Resource Strain:     Difficulty of Paying Living Expenses:    Food Insecurity:     Worried About Running Out of Food in the Last Year:     [address] in the Last Year:    Transportation Needs:     Lack of Transportation (Medical):  Lack of Transportation (Non-Medical):    Physical Activity:     Days of Exercise per Week:     Minutes of Exercise per Session:    Stress:     Feeling of Stress :    Social Connections:     Frequency of Communication with Friends and Family:     Frequency of Social Gatherings with Friends and Family:     Attends [employer] Services:     Active Member of Clubs or Organizations:     Attends Club or Organization Meetings:     Marital Status:    Intimate Partner Violence:     Fear of Current or Ex-Partner:     Emotionally Abused:     Physically Abused:     Sexually Abused:        Family History   Problem Relation Age of Onset    Arthritis Mother     Arthritis Father     Heart Failure Father     Other Brother         sarcodoma    Diabetes Maternal Uncle     Diabetes Maternal Cousin     Diabetes Maternal Cousin     Diabetes Maternal Grandmother         type 2 age onset         Review of Systems   No fever, chills, or other constitutionalsymptoms. No numbness or other neuro symptoms. No chest pain. No dyspnea. [unfilled]   Right shoulder exam demonstrates some anterior posterior tenderness but no erythema or effusion. She is limited to about 80 degrees of flexion and of abduction.   20 degrees of external rotation and internal rotation only to the posterior hip. Abduction strength testing is grossly intact. Left shoulder has near full range of motion including internal rotation to the upper lumbar level. Physical Exam    Patient is alert and oriented. Well-developed well-nourished. Somewhat chronically ill-appearing BMI 24. Pupils equal and reactive. Scleraeanicteric. Neck supple  Lungs clear. Cardiac rate and rhythm regular. Abdomen soft and nontender. Skin warm and dry. XRAY: Outside MRI report of the right shoulder reviewed. Report indicates partial-thickness tearing and tendinosis of the supraspinatus and long head biceps tendons. Some degenerative changes in glenohumeral joint are noted. No bone lesion seen. ASSESSMENT/PLAN:    Cecy Cuello was seen today for shoulder pain. Diagnoses and all orders for this visit:    Adhesive capsulitis of right shoulder associated with type 1 diabetes mellitus ([facility]  -     Amb External Referral To Physical Therapy    Chronic renal failure, unspecified CKD stage    Clinical findings most suggestive of adhesive  capsulitis as the primary issue. Patient's MRI findings do not indicate surgical intervention, nor does she wish to consider such. She requests referral to a therapist she has seen previously for her other shoulder and did well. I discussed with her that while temporary blood glucose elevations after steroid injections must be carefully monitored, steroid injections may be helpful and are not absolutely contraindicated despite her diabetes. Return in about 6 weeks (around 8/9/2021). She will let me know if she wishes to try right shoulder glenohumeral steroid injection.   Trini Govea MD    6/28/2021  2:09 PM

## 2021-08-10 ENCOUNTER — OFFICE VISIT (OUTPATIENT)
Dept: ENDOCRINOLOGY | Age: 41
End: 2021-08-10
Payer: MEDICARE

## 2021-08-10 ENCOUNTER — TELEPHONE (OUTPATIENT)
Dept: ENDOCRINOLOGY | Age: 41
End: 2021-08-10

## 2021-08-10 VITALS
HEART RATE: 91 BPM | OXYGEN SATURATION: 99 % | HEIGHT: 63 IN | BODY MASS INDEX: 24.45 KG/M2 | DIASTOLIC BLOOD PRESSURE: 88 MMHG | WEIGHT: 138 LBS | SYSTOLIC BLOOD PRESSURE: 148 MMHG

## 2021-08-10 DIAGNOSIS — Z96.41 INSULIN PUMP IN PLACE: ICD-10-CM

## 2021-08-10 DIAGNOSIS — E10.42 TYPE 1 DIABETES MELLITUS WITH POLYNEUROPATHY (HCC): ICD-10-CM

## 2021-08-10 DIAGNOSIS — I15.0 RENOVASCULAR HYPERTENSION: ICD-10-CM

## 2021-08-10 DIAGNOSIS — E10.319 TYPE 1 DIABETES MELLITUS WITH RETINOPATHY, MACULAR EDEMA PRESENCE UNSPECIFIED, UNSPECIFIED LATERALITY, UNSPECIFIED RETINOPATHY SEVERITY (HCC): ICD-10-CM

## 2021-08-10 DIAGNOSIS — E55.9 VITAMIN D DEFICIENCY: ICD-10-CM

## 2021-08-10 DIAGNOSIS — E10.22 TYPE 1 DIABETES MELLITUS WITH STAGE 4 CHRONIC KIDNEY DISEASE (HCC): Primary | ICD-10-CM

## 2021-08-10 DIAGNOSIS — N18.4 TYPE 1 DIABETES MELLITUS WITH STAGE 4 CHRONIC KIDNEY DISEASE (HCC): Primary | ICD-10-CM

## 2021-08-10 LAB — HBA1C MFR BLD: 7.7 %

## 2021-08-10 PROCEDURE — 3051F HG A1C>EQUAL 7.0%<8.0%: CPT | Performed by: CLINICAL NURSE SPECIALIST

## 2021-08-10 PROCEDURE — 1036F TOBACCO NON-USER: CPT | Performed by: CLINICAL NURSE SPECIALIST

## 2021-08-10 PROCEDURE — 2022F DILAT RTA XM EVC RTNOPTHY: CPT | Performed by: CLINICAL NURSE SPECIALIST

## 2021-08-10 PROCEDURE — 95251 CONT GLUC MNTR ANALYSIS I&R: CPT | Performed by: CLINICAL NURSE SPECIALIST

## 2021-08-10 PROCEDURE — 99214 OFFICE O/P EST MOD 30 MIN: CPT | Performed by: CLINICAL NURSE SPECIALIST

## 2021-08-10 PROCEDURE — 83036 HEMOGLOBIN GLYCOSYLATED A1C: CPT | Performed by: CLINICAL NURSE SPECIALIST

## 2021-08-10 PROCEDURE — G8427 DOCREV CUR MEDS BY ELIG CLIN: HCPCS | Performed by: CLINICAL NURSE SPECIALIST

## 2021-08-10 PROCEDURE — G8420 CALC BMI NORM PARAMETERS: HCPCS | Performed by: CLINICAL NURSE SPECIALIST

## 2021-08-10 NOTE — PROGRESS NOTES
[address] [place] [address] Department of Endocrinology Diabetes and Metabolism   [address] [facility] [address]   Phone: [phone]  Fax: [phone]    Date of Service: 8/10/2021    Primary Care Physician: Sienna Jeff DO  Referring physician: No ref. provider found  Provider: Myer Sacks     Reason for the visit: Type 1 diabetes       History of Present Illness: The history is provided by the patient. No  was used. Accuracy of the patient data is excellent. Margie Gomez is a very pleasant 45 y.o. female seen today for diabetes management     Margie Gomez was diagnosed with diabetes at age 10  and currently on medtronic 56 G and dexcom . She is trying to get on T slim   Current Medtronic 630 G settings total basal rate 15.175  Midnight 0.65  6 AM 0.6  11 AM 0.575  3 PM 0.675  10 PM 0.625  Carb ratio 5.0  Active insulin time 2:30  Sensitivity 65    She is having issues with canula     The patient has been checking blood sugar uses dexcom       . Most recent A1c results summarized below  Lab Results   Component Value Date    LABA1C 7.7 08/10/2021    LABA1C 7.8 02/09/2021    LABA1C 7.4 09/15/2020     Patient reported infrequent  hypoglycemic episodes    The patient has been mindful of what has been eating and following diabetic diet as encouraged    I reviewed current medications and the patient has no issues with diabetes medications     Last eye exam was 6/2021   ,+ diiabetic retinopathy. Dr Carley Charles.   [facility]  The patient seeing podiatrist, every 3 months   Microvascular complications:  + Retinopathy, Nephropathy , + Neuropathy   Macrovascular complications: no CAD, PVD, or Stroke  The patient receives Flushot every year and up to date with the Pneumonia vaccine   The patient refuses Flushot and pneumonia vaccine     PAST MEDICAL HISTORY   Past Medical History:   Diagnosis Date    Asthma     allergy induced    Blindness weekly       levocetirizine (XYZAL) 5 MG tablet TAKE ONE TABLET BY MOUTH EVERY DAY  1    Continuous Blood Gluc Sensor (DEXCOM G5 MOB/G4 PLAT SENSOR) MISC Dexcom G5 Sensor to check blood sugars. Patient is to change q7days 4 each 5    omeprazole (PRILOSEC) 40 MG delayed release capsule Take 40 mg by mouth as needed      Riboflavin (VITAMIN B-2 PO) Take 400 mg by mouth daily      Magnesium 400 MG CAPS Take by mouth daily      ondansetron (ZOFRAN ODT) 4 MG disintegrating tablet Take 1 tablet by mouth every 8 hours as needed for Nausea or Vomiting 24 tablet 0    vitamin B-1 (THIAMINE) 100 MG tablet Take 250 mg by mouth daily       B complex-vitamin C-folic acid (NEPHRO-YOCASTA) 1 MG tablet Take 1 tablet by mouth daily (with breakfast).  acetaminophen (TYLENOL) 500 MG tablet Take 500 mg by mouth every 6 hours as needed for Pain. No current facility-administered medications for this visit. Review of Systems  Constitutional: No fever, no chills, no diaphoresis, no generalized weakness. HEENT: No blurred vision, No sore throat, no ear pain, no hair loss  Neck: denied any neck swelling, difficulty swallowing,   Cardio-pulmonary: No CP, SOB or palpitation, No orthopnea or PND. No cough or wheezing. GI: No N/V/D, no constipation, No abdominal pain, no melena or hematochezia   : Denied any dysuria, hematuria, flank pain, discharge, or incontinence. Skin: denied any rash, ulcer, Hirsute, or hyperpigmentation. MSK: denied any joint deformity, joint pain/swelling, muscle pain, or back pain.   Neuro: no numbness, no tingling, no weakness, _    OBJECTIVE    BP (!) 148/88   Pulse 91   Ht 5' 3\" (1.6 m)   Wt 138 lb (62.6 kg)   LMP 05/21/2014   SpO2 99%   BMI 24.45 kg/m²   BP Readings from Last 4 Encounters:   08/10/21 (!) 148/88   04/26/21 118/78   02/09/21 118/78   02/20/20 115/74     Wt Readings from Last 6 Encounters:   08/10/21 138 lb (62.6 kg)   06/28/21 135 lb (61.2 kg)   04/26/21 138 lb 12.8 oz (63 kg)   02/09/21 141 lb (64 kg)   09/15/20 141 lb 12.8 oz (64.3 kg)   02/20/20 140 lb (63.5 kg)       Physical examination:  General: awake alert, oriented x3, no abnormal position or movements. HEENT: normocephalic non-traumatic, no exophthalmos   Neck: supple, no LN enlargement, no thyromegaly, no thyroid tenderness, no JVD. Pulm: Clear equal air entry no added sounds, no wheezing or rhonchi    CVS: S1 + S2, no murmur, no heave. Dorsalis pedis pulse palpable   Abd: soft lax, no tenderness, no organomegaly, audible bowel sounds. Skin: warm, no lesions, no rash.  No callus, no Ulcers, No acanthosis nigricans  Musculoskeletal: No back tenderness, no kyphosis/scoliosis    Neuro: CN intact, Monofilament sensation decreased bilateral , muscle power normal  Psych: normal mood, and affect      Review of Laboratory Data:  I personally reviewed the following lab:  Lab Results   Component Value Date/Time    WBC 7.0 11/21/2018 02:11 PM    RBC 3.36 (L) 11/21/2018 02:11 PM    HGB 10.4 (L) 11/21/2018 02:11 PM    HCT 31.6 (L) 11/21/2018 02:11 PM    MCV 94.0 11/21/2018 02:11 PM    MCH 31.0 11/21/2018 02:11 PM    MCHC 32.9 11/21/2018 02:11 PM    RDW 12.4 11/21/2018 02:11 PM     11/21/2018 02:11 PM    MPV 12.1 (H) 11/21/2018 02:11 PM      Lab Results   Component Value Date/Time     11/21/2018 02:11 PM    K 4.4 11/21/2018 02:11 PM    CO2 25 11/21/2018 02:11 PM    BUN 33 (H) 11/21/2018 02:11 PM    CREATININE 2.8 (H) 11/21/2018 02:11 PM    CALCIUM 9.3 03/21/2019 12:00 AM    LABGLOM 19 11/21/2018 02:11 PM    GFRAA 23 11/21/2018 02:11 PM      No results found for: TSH, T4FREE, P9EHZQP, FT3, T5CCYGG, TSI, TPOABS, THGAB  Lab Results   Component Value Date    LABA1C 7.7 08/10/2021    GLUCOSE 134 11/21/2018    LABCREA 50 11/21/2018     Lab Results   Component Value Date    LABA1C 7.7 08/10/2021    LABA1C 7.8 02/09/2021    LABA1C 7.4 09/15/2020     Lab Results   Component Value Date    TRIG 110 06/22/2016    HDL 67 06/22/2016    [address] 06/22/2016    CHOL 206 06/22/2016     Lab Results   Component Value Date    VITD25 >120 11/21/2018    VITD25 36 10/27/2015       ASSESSMENT & RECOMMENDATIONS   Lucina Mauro, a 45 y.o.-old female seen in for the following issues      Diagnosis Orders   1. Type 1 diabetes mellitus with stage 4 chronic kidney disease (HCC)  POCT glycosylated hemoglobin (Hb A1C)    HM DIABETES FOOT EXAM   2. Insulin pump in place     3. Vitamin D deficiency     4. Renovascular hypertension     5. Type 1 diabetes mellitus with polyneuropathy (HCC)     6. Type 1 diabetes mellitus with retinopathy, macular edema presence unspecified, unspecified laterality, unspecified retinopathy severity ([address]         Diabetes Mellitus Type 1     · Patient's diabetes is not well controlled. Blood sugars higher in the middle of the night, mid afternoon, and at bedtime. · Will adjust midnight basal rate to 0.7, 11 AM basal rate to 0.65, and 10 PM basal rate to 0.7  · Insulin pump settings will look as follows:  Midnight 0.7  6 AM 0.6  11 AM 0.65  3 PM 0.675  10 PM 0.7  Carb ratio 5.0  Active insulin time 2:30  Sensitivity 65  · Patient advised to check blood sugars as needed and call if blood glucose less than 70 or greater than 300 3 consecutive times. · Discussed with patient A1c and blood sugar goals   · Optimal blood sugars: 100-140 pre-prandial, < 180 peak post-prandial  · The patient counseled about the complications of uncontrolled diabetes   · Patient was counselled about the importance of self-blood glucose monitoring and eating consistent carb diet to avoid blood sugar fluctuations   · Discussed lifestyle changes including diet and exercise with patient; recommended 150 minutes of moderate intensity exercise per week. · Labs reviewed and located in care everywhere  · Insulin pump and CGM reviewed    Insulin pump in place    Vitamin D deficiency  -Continue vitamin D.   Last vitamin D within normal limits    Renovascular hypertension  -BP stable on Benicar. Following with nephrology    Type 1 diabetes mellitus with polyneuropathy  -Advised optimal foot care. Following with podiatry    Type 1 diabetes mellitus with retinopathy  -Following with ophthalmology    I personally spent greater than 30 minutes reviewing external notes from PCP and other patient's care team providers, and personally interpreted labs associated with the above diagnosis. I also ordered labs to further assess and manage the above addressed medical conditions. Return in about 3 months (around 11/10/2021). The above issues were reviewed with the patient who understood and agreed with the plan. Thank you for allowing us to participate in the care of this patient. Please do not hesitate to contact us with any additional questions. Cristal JOSEPH   Endocrinologist, [facility]   [address] [place] [address]   Phone: [phone]  Fax: [phone]  --------------------------------------------  An electronic signature was used to authenticate this note.  Cristal JOSEPH on 8/10/2021 at 12:24 PM

## 2021-08-13 NOTE — TELEPHONE ENCOUNTER
Called [facility]. They stated they haven't seen patient since 2008 after multiple attempts to get ahold of her. Also there is not a doctor in the office by that name.

## 2021-08-16 ENCOUNTER — TELEPHONE (OUTPATIENT)
Dept: BREAST CENTER | Age: 41
End: 2021-08-16

## 2021-08-16 ENCOUNTER — HOSPITAL ENCOUNTER (OUTPATIENT)
Dept: NUCLEAR MEDICINE | Age: 41
Discharge: HOME | End: 2021-08-18
Payer: MEDICARE

## 2021-08-16 DIAGNOSIS — R92.8 ABNORMAL FINDINGS ON DIAGNOSTIC IMAGING OF BREAST: Primary | ICD-10-CM

## 2021-08-16 DIAGNOSIS — R92.8 ABNORMAL FINDINGS ON DIAGNOSTIC IMAGING OF BREAST: ICD-10-CM

## 2021-08-16 PROCEDURE — 3430000000 HC RX DIAGNOSTIC RADIOPHARMACEUTICAL: Performed by: RADIOLOGY

## 2021-08-16 PROCEDURE — 78800 RP LOCLZJ TUM 1 AREA 1 D IMG: CPT

## 2021-08-16 PROCEDURE — A9500 TC99M SESTAMIBI: HCPCS | Performed by: RADIOLOGY

## 2021-08-16 RX ADMIN — Medication 8 MILLICURIE: at 13:15

## 2021-08-16 NOTE — TELEPHONE ENCOUNTER
Spoke to Rai to review MBI results (negative). Copy of results sent to Dr. Enrike Burrell via fax. Juany Dorsey, RN, MSN, APRN-CNP, [address]  Advanced Oncology Certified Nurse Practitioner  Department of Breast Surgery  [facility]/  [facility] in collaboration with Dr. Chet Chavez/Dr. Merline Perkins APRN-CNP

## 2021-08-16 NOTE — PROGRESS NOTES
Patient arrived for MBI due to abnormal diagnostic imaging of the breast; Ordering provider is not in the system. However, Imaging was here at [facility] and recommendations were for an MBI of the breast (no MRI due to decreased GFR). Orders placed for MBI per protocol. Sabrina oMjica, RN, MSN, APRN-CNP, [address] [place]  Advanced Oncology Certified Nurse Practitioner  Department of Breast Surgery  [facility]/  [facility] in collaboration with Dr. Ngozi Butler.  Scott/Dr. Ger Crowe APRN-CNP

## 2021-10-08 ENCOUNTER — TELEPHONE (OUTPATIENT)
Dept: ENT CLINIC | Age: 41
End: 2021-10-08

## 2021-10-08 NOTE — TELEPHONE ENCOUNTER
Pt ear started bleeding a couple days ago, ear canal swollen and painful. When using a q-tip it starts to bleed again. Bleeding started 3 days ago, painfulness has been going on 3 months. On amoxicillin. Please call pt for an appointment.     Electronically signed by Justin Gutierrez on 10/8/2021 at 2:35 PM

## 2021-10-08 NOTE — TELEPHONE ENCOUNTER
I would send that to ENT then after they evaluate and feel Dr. Eliot Headley can further assist patient we can. ambulatory

## 2021-11-24 ENCOUNTER — OFFICE VISIT (OUTPATIENT)
Dept: ENDOCRINOLOGY | Age: 41
End: 2021-11-24
Payer: MEDICARE

## 2021-11-24 VITALS
SYSTOLIC BLOOD PRESSURE: 123 MMHG | DIASTOLIC BLOOD PRESSURE: 72 MMHG | BODY MASS INDEX: 24.45 KG/M2 | HEART RATE: 80 BPM | HEIGHT: 63 IN | WEIGHT: 138 LBS

## 2021-11-24 DIAGNOSIS — Z96.41 INSULIN PUMP IN PLACE: ICD-10-CM

## 2021-11-24 DIAGNOSIS — E10.22 TYPE 1 DIABETES MELLITUS WITH STAGE 4 CHRONIC KIDNEY DISEASE (HCC): Primary | ICD-10-CM

## 2021-11-24 DIAGNOSIS — N18.4 TYPE 1 DIABETES MELLITUS WITH STAGE 4 CHRONIC KIDNEY DISEASE (HCC): Primary | ICD-10-CM

## 2021-11-24 DIAGNOSIS — E55.9 VITAMIN D DEFICIENCY: ICD-10-CM

## 2021-11-24 PROCEDURE — 99214 OFFICE O/P EST MOD 30 MIN: CPT | Performed by: INTERNAL MEDICINE

## 2021-11-24 PROCEDURE — 2022F DILAT RTA XM EVC RTNOPTHY: CPT | Performed by: INTERNAL MEDICINE

## 2021-11-24 PROCEDURE — 3051F HG A1C>EQUAL 7.0%<8.0%: CPT | Performed by: INTERNAL MEDICINE

## 2021-11-24 PROCEDURE — G8428 CUR MEDS NOT DOCUMENT: HCPCS | Performed by: INTERNAL MEDICINE

## 2021-11-24 PROCEDURE — G8484 FLU IMMUNIZE NO ADMIN: HCPCS | Performed by: INTERNAL MEDICINE

## 2021-11-24 PROCEDURE — G8420 CALC BMI NORM PARAMETERS: HCPCS | Performed by: INTERNAL MEDICINE

## 2021-11-24 PROCEDURE — 95251 CONT GLUC MNTR ANALYSIS I&R: CPT | Performed by: INTERNAL MEDICINE

## 2021-11-24 PROCEDURE — 1036F TOBACCO NON-USER: CPT | Performed by: INTERNAL MEDICINE

## 2021-11-24 NOTE — PROGRESS NOTES
[address] [place] [address] Department of Endocrinology Diabetes and Metabolism   [address] [address]   Phone: [phone]  Fax: [phone]    Date of Service: 11/24/2021  Primary Care Physician: Jan Ward DO  Provider: Cata Medellin MD    Reason for the visit:   Type 1 diabetes     History of Present Illness: The history is provided by the patient. No  was used. Accuracy of the patient data is excellent. Beverley Zuniga is a very pleasant 37 y.o. female seen today for diabetes management     Beverley Zuniga was diagnosed with diabetes at age 10  and currently on medtronic 56 G and dexcom . She is trying to get on T slim   Current Medtronic 630 G settings Basal rate 12a 0.65, 6a 0.6, 11a 0.575, 3p 0.675, 10p 0.625, CR  7.0, ISF 55, Active insulin time 2:30hrs   The patient has been checking blood sugar uses dexcom   Most recent A1c results summarized below  Lab Results   Component Value Date    LABA1C 7.7 08/10/2021    LABA1C 7.8 02/09/2021    LABA1C 7.4 09/15/2020     Patient reported infrequent  hypoglycemic episodes    The patient has been mindful of what has been eating and following diabetic diet as encouraged    I reviewed current medications and the patient has no issues with diabetes medications     Last eye exam was 6/2021   ,+ diiabetic retinopathy. Dr Denver Moder.   [facility]  The patient seeing podiatrist, every 3 months   Microvascular complications:  + Retinopathy, Nephropathy , + Neuropathy   Macrovascular complications: no CAD, PVD, or Stroke  The patient receives Flushot every year and up to date with the Pneumonia vaccine   The patient refuses Flushot and pneumonia vaccine     PAST MEDICAL HISTORY   Past Medical 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    Chronic renal failure     Degeneration of cervical intervertebral disc     Depression     Not on medication    Diabetic peripheral neuropathy associated with type 1 diabetes mellitus ([facility] [address] 7/19/2019    Diabetic retinopathy ([facility] [address]     Hyperlipidemia     Hypertension     Peripheral autonomic neuropathy due to diabetes mellitus ([facility] [address]     Type 1 diabetes mellitus ([facility] [address]        PAST SURGICAL HISTORY   Past Surgical History:   Procedure Laterality Date    HYSTERECTOMY, TOTAL ABDOMINAL      SHOULDER SURGERY      TRANSTHORACIC ECHOCARDIOGRAM  4/9/13    LVEF 55%    US BREAST NEEDLE BIOPSY RIGHT Right 4/6/2021    US BREAST NEEDLE BIOPSY RIGHT 4/6/2021 [facility]    VITRECTOMY Right 2009    VITRECTOMY Left 2008       SOCIAL HISTORY   Tobacco:   reports that she has never smoked. She has never used smokeless tobacco.  Alcohol:   reports previous alcohol use. Drugs:   reports current drug use. Drug: Marijuana Adrienne Salazar). FAMILY HISTORY   Family History   Problem Relation Age of Onset    Arthritis Mother     Arthritis Father     Heart Failure Father     Other Brother         sarcodoma    Diabetes Maternal Uncle     Diabetes Maternal Cousin     Diabetes Maternal Cousin     Diabetes Maternal Grandmother         type 2 age onset       ALLERGIES AND DRUG REACTIONS   Allergies   Allergen Reactions    Lisinopril     Morphine Nausea And Vomiting       CURRENT MEDICATIONS   Current Outpatient Medications   Medication Sig Dispense Refill    insulin lispro (HUMALOG) 100 UNIT/ML injection vial 100 units/day via insulin pump (Patient taking differently: Inject into the skin 30-40units/day via insulin pump) 9 vial 3    medical marijuana Take 2 mg by mouth Twice a Week.       olmesartan (BENICAR) 5 MG tablet Take 5 mg by mouth daily      Cholecalciferol (VITAMIN D) 50 MCG (2000 UT) CAPS capsule Take 4,000 Units by mouth daily      Ferrous Gluconate (IRON 27 PO) Take 25 mg by mouth Five times weekly       levocetirizine (XYZAL) 5 MG tablet TAKE ONE TABLET BY MOUTH EVERY DAY  1    Continuous Blood Gluc Sensor (DEXCOM G5 MOB/G4 PLAT SENSOR) MISC Dexcom G5 Sensor to check blood sugars. Patient is to change q7days 4 each 5    omeprazole (PRILOSEC) 40 MG delayed release capsule Take 40 mg by mouth as needed      Riboflavin (VITAMIN B-2 PO) Take 400 mg by mouth daily      Magnesium 400 MG CAPS Take by mouth daily      ondansetron (ZOFRAN ODT) 4 MG disintegrating tablet Take 1 tablet by mouth every 8 hours as needed for Nausea or Vomiting 24 tablet 0    vitamin B-1 (THIAMINE) 100 MG tablet Take 250 mg by mouth daily       B complex-vitamin C-folic acid (NEPHRO-YOCASTA) 1 MG tablet Take 1 tablet by mouth daily (with breakfast).  acetaminophen (TYLENOL) 500 MG tablet Take 500 mg by mouth every 6 hours as needed for Pain. No current facility-administered medications for this visit. Review of Systems  Constitutional: No fever, no chills, no diaphoresis, no generalized weakness. HEENT: No blurred vision, No sore throat, no ear pain, no hair loss  Neck: denied any neck swelling, difficulty swallowing,   Cardio-pulmonary: No CP, SOB or palpitation, No orthopnea or PND. No cough or wheezing. GI: No N/V/D, no constipation, No abdominal pain, no melena or hematochezia   : Denied any dysuria, hematuria, flank pain, discharge, or incontinence. Skin: denied any rash, ulcer, Hirsute, or hyperpigmentation. MSK: denied any joint deformity, joint pain/swelling, muscle pain, or back pain.   Neuro: no numbness, no tingling, no weakness, _    OBJECTIVE    /72   Pulse 80   Ht 5' 3\" (1.6 m)   Wt 138 lb (62.6 kg)   LMP 05/21/2014   BMI 24.45 kg/m²   BP Readings from Last 4 Encounters:   11/24/21 123/72   08/10/21 (!) 148/88   04/26/21 118/78   02/09/21 118/78     Wt Readings from Last 6 Encounters:   11/24/21 138 lb (62.6 kg)   08/10/21 138 lb (62.6 kg)   06/28/21 135 lb (61.2 kg)   04/26/21 138 lb 12.8 oz (63 kg)   02/09/21 141 lb (64 kg)   09/15/20 141 lb 12.8 oz (64.3 kg)       Physical examination:  General: awake alert, oriented x3, no abnormal position or movements. HEENT: normocephalic non-traumatic, no exophthalmos   Neck: supple, no LN enlargement, no thyromegaly, no thyroid tenderness, no JVD. Pulm: Clear equal air entry no added sounds, no wheezing or rhonchi    CVS: S1 + S2, no murmur, no heave. Dorsalis pedis pulse palpable   Abd: soft lax, no tenderness, no organomegaly, audible bowel sounds. Skin: warm, no lesions, no rash.  No callus, no Ulcers, No acanthosis nigricans  Musculoskeletal: No back tenderness, no kyphosis/scoliosis    Neuro: CN intact, Monofilament sensation decreased bilateral , muscle power normal  Psych: normal mood, and affect      Review of Laboratory Data:  I personally reviewed the following lab:  Lab Results   Component Value Date/Time    WBC 7.0 11/21/2018 02:11 PM    RBC 3.36 (L) 11/21/2018 02:11 PM    HGB 10.4 (L) 11/21/2018 02:11 PM    HCT 31.6 (L) 11/21/2018 02:11 PM    MCV 94.0 11/21/2018 02:11 PM    MCH 31.0 11/21/2018 02:11 PM    MCHC 32.9 11/21/2018 02:11 PM    RDW 12.4 11/21/2018 02:11 PM     11/21/2018 02:11 PM    MPV 12.1 (H) 11/21/2018 02:11 PM      Lab Results   Component Value Date/Time     11/21/2018 02:11 PM    K 4.4 11/21/2018 02:11 PM    CO2 25 11/21/2018 02:11 PM    BUN 33 (H) 11/21/2018 02:11 PM    CREATININE 2.8 (H) 11/21/2018 02:11 PM    CALCIUM 9.3 03/21/2019 12:00 AM    LABGLOM 19 11/21/2018 02:11 PM    GFRAA 23 11/21/2018 02:11 PM      No results found for: TSH, T4FREE, T0AQDUN, FT3, N8UMKDE, TSI, TPOABS, THGAB  Lab Results   Component Value Date    LABA1C 7.7 08/10/2021    GLUCOSE 134 11/21/2018    LABCREA 50 11/21/2018     Lab Results   Component Value Date    LABA1C 7.7 08/10/2021    LABA1C 7.8 02/09/2021    LABA1C 7.4 09/15/2020     Lab Results   Component Value Date    TRIG 110 06/22/2016    HDL 67 06/22/2016    LDLCALC 117 06/22/2016    CHOL 206 06/22/2016     Lab Results   Component Value Date    VITD25 >120 11/21/2018    VITD25 36 10/27/2015       ASSESSMENT & RECOMMENDATIONS   Lillian Jones, a 37 y.o.-old female seen in for the following issues      Diagnosis Orders   1. Type 1 diabetes mellitus with stage 4 chronic kidney disease (HCC)  MD CONTINUOUS GLUCOSE MONITORING ANALYSIS I&R   2. Insulin pump in place     3. Vitamin D deficiency         Diabetes Mellitus Type 1     · Patient's diabetes is not well controlled. Blood sugars higher in the middle of the night, mid afternoon, and at bedtime. · Will adjust pump settings to: Basal rate 12a 0.65, 6a 0.6, 11a 0.575, 3p 0.675, 10p 0.625, CR 8.0, ISF 70, Active insulin time 3:30hrs   · Continue using DEXCOM CGM   · Discussed with patient A1c and blood sugar goals  · Labs reviewed and located in care everywhere  · Insulin pump and CGM reviewed    Continuous Glucose Monitoring (CGM) download and interpretation    I personally reviewed and interpreted continuous glucose monitor (CGM) download. CGM report was discussed with patient including blood glucose patterns, percentages of blood glucose at goal, above goal and below goal. Insulin dosages/antidiabetic regimen was adjusted according to CGM download. Full CGM was scanned under media. Vitamin D deficiency  · Continue vitamin D. Last vitamin D within normal limits    Renovascular hypertension  · BP stable on Benicar. Following with nephrology    Type 1 diabetes mellitus with polyneuropathy  · Advised optimal foot care. Following with podiatry    Type 1 diabetes mellitus with retinopathy  · Following with ophthalmology    I personally spent greater than 30 minutes reviewing external notes from PCP and other patient's care team providers, and personally interpreted labs associated with the above diagnosis. I also ordered labs to further assess and manage the above addressed medical conditions. Return in about 3 months (around 2/24/2022) for DM type 1, VitD deficiency.     The above issues were reviewed with the patient who understood and agreed with the plan. Thank you for allowing us to participate in the care of this patient. Please do not hesitate to contact us with any additional questions. Nyasia Schroeder MD  Endocrinologist, [facility] Diabetes Care and Endocrinology   [address] [place] [id number]   Phone: [phone]  Fax: [phone]  --------------------------------------------  An electronic signature was used to authenticate this note.  Nyasia Schroeder MD on 11/24/2021 at 3:44 PM

## 2022-03-25 ENCOUNTER — TELEPHONE (OUTPATIENT)
Dept: ENT CLINIC | Age: 42
End: 2022-03-25

## 2022-03-25 NOTE — TELEPHONE ENCOUNTER
PT lmom 3/24 @4:15, said she has talked w/office before but never scheduled. Needs New Pt appt (?) to be seen for right ear, said nearly closed.  [phone]

## 2022-03-25 NOTE — TELEPHONE ENCOUNTER
MA spoke with patient, scheduled in [address] [place] [address] on 4/22 with Howard Olivares.     Electronically signed by Rakesh Caro, MA on 3/25/22 at 10:55 AM EDT

## 2022-03-29 ENCOUNTER — OFFICE VISIT (OUTPATIENT)
Dept: ENDOCRINOLOGY | Age: 42
End: 2022-03-29
Payer: MEDICARE

## 2022-03-29 VITALS
WEIGHT: 138 LBS | HEIGHT: 63 IN | BODY MASS INDEX: 24.45 KG/M2 | DIASTOLIC BLOOD PRESSURE: 86 MMHG | OXYGEN SATURATION: 100 % | HEART RATE: 82 BPM | SYSTOLIC BLOOD PRESSURE: 146 MMHG

## 2022-03-29 DIAGNOSIS — I15.0 RENOVASCULAR HYPERTENSION: ICD-10-CM

## 2022-03-29 DIAGNOSIS — E55.9 VITAMIN D DEFICIENCY: ICD-10-CM

## 2022-03-29 DIAGNOSIS — E10.42 TYPE 1 DIABETES MELLITUS WITH POLYNEUROPATHY (HCC): ICD-10-CM

## 2022-03-29 DIAGNOSIS — Z96.41 INSULIN PUMP IN PLACE: ICD-10-CM

## 2022-03-29 DIAGNOSIS — E10.65 TYPE 1 DIABETES MELLITUS WITH HYPERGLYCEMIA (HCC): Primary | ICD-10-CM

## 2022-03-29 DIAGNOSIS — E10.319 TYPE 1 DIABETES MELLITUS WITH RETINOPATHY, MACULAR EDEMA PRESENCE UNSPECIFIED, UNSPECIFIED LATERALITY, UNSPECIFIED RETINOPATHY SEVERITY (HCC): ICD-10-CM

## 2022-03-29 LAB — HBA1C MFR BLD: 7.5 %

## 2022-03-29 PROCEDURE — 3051F HG A1C>EQUAL 7.0%<8.0%: CPT | Performed by: CLINICAL NURSE SPECIALIST

## 2022-03-29 PROCEDURE — 2022F DILAT RTA XM EVC RTNOPTHY: CPT | Performed by: CLINICAL NURSE SPECIALIST

## 2022-03-29 PROCEDURE — G8484 FLU IMMUNIZE NO ADMIN: HCPCS | Performed by: CLINICAL NURSE SPECIALIST

## 2022-03-29 PROCEDURE — G8420 CALC BMI NORM PARAMETERS: HCPCS | Performed by: CLINICAL NURSE SPECIALIST

## 2022-03-29 PROCEDURE — G8427 DOCREV CUR MEDS BY ELIG CLIN: HCPCS | Performed by: CLINICAL NURSE SPECIALIST

## 2022-03-29 PROCEDURE — 99214 OFFICE O/P EST MOD 30 MIN: CPT | Performed by: CLINICAL NURSE SPECIALIST

## 2022-03-29 PROCEDURE — 1036F TOBACCO NON-USER: CPT | Performed by: CLINICAL NURSE SPECIALIST

## 2022-03-29 PROCEDURE — 83036 HEMOGLOBIN GLYCOSYLATED A1C: CPT | Performed by: CLINICAL NURSE SPECIALIST

## 2022-03-29 PROCEDURE — 95251 CONT GLUC MNTR ANALYSIS I&R: CPT | Performed by: CLINICAL NURSE SPECIALIST

## 2022-03-29 RX ORDER — CALCITRIOL 0.25 UG/1
0.25 CAPSULE, LIQUID FILLED ORAL DAILY
COMMUNITY
Start: 2022-03-03 | End: 2023-03-03

## 2022-03-29 NOTE — PROGRESS NOTES
[address] [place] [address] Department of Endocrinology Diabetes and Metabolism   [address] [facility] [address]   Phone: [phone]  Fax: [phone]    Date of Service: 3/29/2022    Primary Care Physician: Cheko Davis DO  Referring physician: No ref. provider found  Provider: Cristela Osorio     Reason for the visit: Type 1 diabetes       History of Present Illness: The history is provided by the patient. No  was used. Accuracy of the patient data is excellent. 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 She is trying to get on T slim   Current Medtronic 630 G settings total basal rate 16.225  Midnight 0.7  3 am 0.7  8 AM 0.6  3 PM 0.725  8 PM 0.7    Carb ratio 7  Active insulin time 3:30  Sensitivity 80    She adjusted her pump settings on her own approx 1 week ago      The patient has been checking blood sugar uses dexcom   Time in range 45%  Average glucose 203      . Most recent A1c results summarized below  Lab Results   Component Value Date    LABA1C 7.5 03/29/2022    LABA1C 7.7 08/10/2021    LABA1C 7.8 02/09/2021     Patient reported infrequent  hypoglycemic episodes    The patient has been mindful of what has been eating and following diabetic diet as encouraged    I reviewed current medications and the patient has no issues with diabetes medications     Last eye exam was 6/2021   ,+ diiabetic retinopathy. Dr Pascual Anand.   [facility]  The patient seeing podiatrist, every 3 months   Microvascular complications:  + Retinopathy, Nephropathy , + Neuropathy   Macrovascular complications: no CAD, PVD, or Stroke    The patient refuses Flushot and pneumonia vaccine     PAST MEDICAL HISTORY   Past Medical 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    Chronic renal failure     Degeneration of cervical intervertebral disc     Depression     Not on medication    Diabetic peripheral neuropathy associated with type 1 diabetes mellitus ([facility] [address] 7/19/2019    Diabetic retinopathy ([facility] [address]     Hyperlipidemia     Hypertension     Peripheral autonomic neuropathy due to diabetes mellitus ([facility] [address]     Type 1 diabetes mellitus ([facility] [address]        PAST SURGICAL HISTORY   Past Surgical History:   Procedure Laterality Date    HYSTERECTOMY, TOTAL ABDOMINAL      SHOULDER SURGERY      TRANSTHORACIC ECHOCARDIOGRAM  4/9/13    LVEF 55%    US BREAST NEEDLE BIOPSY RIGHT Right 4/6/2021    US BREAST NEEDLE BIOPSY RIGHT 4/6/2021 [facility]    VITRECTOMY Right 2009    VITRECTOMY Left 2008       SOCIAL HISTORY   Tobacco:   reports that she has never smoked. She has never used smokeless tobacco.  Alcohol:   reports previous alcohol use. Drugs:   reports current drug use. Drug: Marijuana Tray Umana). FAMILY HISTORY   Family History   Problem Relation Age of Onset    Arthritis Mother     Arthritis Father     Heart Failure Father     Other Brother         sarcodoma    Diabetes Maternal Uncle     Diabetes Maternal Cousin     Diabetes Maternal Cousin     Diabetes Maternal Grandmother         type 2 age onset       ALLERGIES AND DRUG REACTIONS   Allergies   Allergen Reactions    Lisinopril     Morphine Nausea And Vomiting       CURRENT MEDICATIONS   Current Outpatient Medications   Medication Sig Dispense Refill    calcitRIOL (ROCALTROL) 0.25 MCG capsule Take 0.25 mcg by mouth daily      insulin lispro (HUMALOG) 100 UNIT/ML injection vial 100 units/day via insulin pump (Patient taking differently: Inject into the skin 30-40units/day via insulin pump) 9 vial 3    medical marijuana Take 2 mg by mouth Twice a Week.       olmesartan (BENICAR) 5 MG tablet Take 5 mg by mouth daily      Cholecalciferol (VITAMIN D) 50 MCG (2000 UT) CAPS capsule Take 4,000 Units by mouth daily (Patient not taking: Reported on 3/29/2022)      Ferrous Gluconate (IRON 27 PO) Take 25 mg by mouth Five times weekly       levocetirizine (XYZAL) 5 MG tablet TAKE ONE TABLET BY MOUTH EVERY DAY  1    Continuous Blood Gluc Sensor (DEXCOM G5 MOB/G4 PLAT SENSOR) MISC Dexcom G5 Sensor to check blood sugars. Patient is to change q7days 4 each 5    omeprazole (PRILOSEC) 40 MG delayed release capsule Take 40 mg by mouth as needed      Riboflavin (VITAMIN B-2 PO) Take 400 mg by mouth daily      Magnesium 400 MG CAPS Take by mouth daily      ondansetron (ZOFRAN ODT) 4 MG disintegrating tablet Take 1 tablet by mouth every 8 hours as needed for Nausea or Vomiting 24 tablet 0    vitamin B-1 (THIAMINE) 100 MG tablet Take 250 mg by mouth daily       B complex-vitamin C-folic acid (NEPHRO-YOCASTA) 1 MG tablet Take 1 tablet by mouth daily (with breakfast).  acetaminophen (TYLENOL) 500 MG tablet Take 500 mg by mouth every 6 hours as needed for Pain. No current facility-administered medications for this visit. Review of Systems  Constitutional: No fever, no chills, no diaphoresis, no generalized weakness. HEENT: No blurred vision, No sore throat, no ear pain, no hair loss  Neck: denied any neck swelling, difficulty swallowing,   Cardio-pulmonary: No CP, SOB or palpitation, No orthopnea or PND. No cough or wheezing. GI: No N/V/D, no constipation, No abdominal pain, no melena or hematochezia   : Denied any dysuria, hematuria, flank pain, discharge, or incontinence. Skin: denied any rash, ulcer, Hirsute, or hyperpigmentation. MSK: denied any joint deformity, joint pain/swelling, muscle pain, or back pain.   Neuro: no numbness, no tingling, no weakness, _    OBJECTIVE    BP (!) 146/86   Pulse 82   Ht 5' 3\" (1.6 m)   Wt 138 lb (62.6 kg)   LMP 05/21/2014   SpO2 100%   BMI 24.45 kg/m²   BP Readings from Last 4 Encounters:   03/29/22 (!) 146/86   11/24/21 123/72   08/10/21 (!) 148/88   04/26/21 118/78 Wt Readings from Last 6 Encounters:   03/29/22 138 lb (62.6 kg)   11/24/21 138 lb (62.6 kg)   08/10/21 138 lb (62.6 kg)   06/28/21 135 lb (61.2 kg)   04/26/21 138 lb 12.8 oz (63 kg)   02/09/21 141 lb (64 kg)       Physical examination:  General: awake alert, oriented x3, no abnormal position or movements. HEENT: normocephalic non-traumatic, no exophthalmos   Neck: supple, no LN enlargement, no thyromegaly, no thyroid tenderness, no JVD. Pulm: Clear equal air entry no added sounds, no wheezing or rhonchi    CVS: S1 + S2, no murmur, no heave. Dorsalis pedis pulse palpable   Abd: soft lax, no tenderness, no organomegaly, audible bowel sounds. Skin: warm, no lesions, no rash.  No callus, no Ulcers, No acanthosis nigricans  Musculoskeletal: No back tenderness, no kyphosis/scoliosis    Neuro: CN intact, Monofilament sensation decreased bilateral , muscle power normal  Psych: normal mood, and affect      Review of Laboratory Data:  I personally reviewed the following lab:  Lab Results   Component Value Date/Time    WBC 7.0 11/21/2018 02:11 PM    RBC 3.36 (L) 11/21/2018 02:11 PM    HGB 10.4 (L) 11/21/2018 02:11 PM    HCT 31.6 (L) 11/21/2018 02:11 PM    MCV 94.0 11/21/2018 02:11 PM    MCH 31.0 11/21/2018 02:11 PM    MCHC 32.9 11/21/2018 02:11 PM    RDW 12.4 11/21/2018 02:11 PM     11/21/2018 02:11 PM    MPV 12.1 (H) 11/21/2018 02:11 PM      Lab Results   Component Value Date/Time     11/21/2018 02:11 PM    K 4.4 11/21/2018 02:11 PM    CO2 25 11/21/2018 02:11 PM    BUN 33 (H) 11/21/2018 02:11 PM    CREATININE 2.8 (H) 11/21/2018 02:11 PM    CALCIUM 9.3 03/21/2019 12:00 AM    LABGLOM 19 11/21/2018 02:11 PM    GFRAA 23 11/21/2018 02:11 PM      No results found for: TSH, T4FREE, B7HSOLP, FT3, M1LTLIE, TSI, TPOABS, THGAB  Lab Results   Component Value Date    LABA1C 7.5 03/29/2022    GLUCOSE 134 11/21/2018    LABCREA 50 11/21/2018     Lab Results   Component Value Date    LABA1C 7.5 03/29/2022    LABA1C 7.7 hypertension  · -BP stable on Benicar. Following with nephrology    Type 1 diabetes mellitus with polyneuropathy  · -Advised optimal foot care. Following with podiatry    Type 1 diabetes mellitus with retinopathy  · -Following with ophthalmology    I personally spent greater than 30 minutes reviewing external notes from PCP and other patient's care team providers, and personally interpreted labs associated with the above diagnosis. I also ordered labs to further assess and manage the above addressed medical conditions. Return in about 3 months (around 6/29/2022). The above issues were reviewed with the patient who understood and agreed with the plan. Thank you for allowing us to participate in the care of this patient. Please do not hesitate to contact us with any additional questions. Marzena JOSEPH   Endocrinologist, [facility] Diabetes Care and Endocrinology   [address] [place], [place] [address]   Phone: [phone]  Fax: [phone]  --------------------------------------------  An electronic signature was used to authenticate this note.  TEN Bedolla - CNS  on 3/29/2022 at 10:39 AM General

## 2022-04-08 ENCOUNTER — OFFICE VISIT (OUTPATIENT)
Dept: FAMILY MEDICINE CLINIC | Age: 42
End: 2022-04-08
Payer: MEDICARE

## 2022-04-08 VITALS
SYSTOLIC BLOOD PRESSURE: 160 MMHG | TEMPERATURE: 97.3 F | BODY MASS INDEX: 25.16 KG/M2 | WEIGHT: 142 LBS | RESPIRATION RATE: 16 BRPM | OXYGEN SATURATION: 100 % | HEART RATE: 94 BPM | HEIGHT: 63 IN | DIASTOLIC BLOOD PRESSURE: 74 MMHG
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
## 2022-04-08 DIAGNOSIS — M79.642 LEFT HAND PAIN: ICD-10-CM

## 2022-04-08 DIAGNOSIS — Z23 NEED FOR TDAP VACCINATION: ICD-10-CM

## 2022-04-08 DIAGNOSIS — S60.419A ABRASION OF FINGER OF LEFT HAND, INITIAL ENCOUNTER: ICD-10-CM

## 2022-04-08 DIAGNOSIS — M25.532 LEFT WRIST PAIN: ICD-10-CM

## 2022-04-08 DIAGNOSIS — H57.12 PERIORBITAL PAIN, LEFT: ICD-10-CM

## 2022-04-08 PROCEDURE — 90471 IMMUNIZATION ADMIN: CPT | Performed by: PHYSICIAN ASSISTANT

## 2022-04-08 PROCEDURE — 99214 OFFICE O/P EST MOD 30 MIN: CPT | Performed by: PHYSICIAN ASSISTANT

## 2022-04-08 PROCEDURE — 1036F TOBACCO NON-USER: CPT | Performed by: PHYSICIAN ASSISTANT

## 2022-04-08 PROCEDURE — G8419 CALC BMI OUT NRM PARAM NOF/U: HCPCS | Performed by: PHYSICIAN ASSISTANT

## 2022-04-08 PROCEDURE — G8427 DOCREV CUR MEDS BY ELIG CLIN: HCPCS | Performed by: PHYSICIAN ASSISTANT

## 2022-04-08 RX ORDER — CHLORHEXIDINE GLUCONATE 0.12 MG/ML
15 RINSE ORAL
COMMUNITY
Start: 2021-10-04

## 2022-04-08 NOTE — PROGRESS NOTES
Chief Complaint       Hand Pain (Left), Wrist Pain (Left), and Facial Pain (Left cheekbone)      History of Present Illness   Source of history provided by: patient. Nelsy Rolon is a 37 y.o. old female presenting to the walk in clinic for evaluation after tripping and falling in her driveway late last night. She sustained injuries to her left wrist, left pinky, and left lower periorbital region. Patient is incidentally blind in the left eye which she attributes to falling. Reports associated swelling, ecchymosis, and bruising. Denies any paresthesias, obvious deformity, elbow pain, shoulder pain, weakness, fever, chills, or N/V.she denies any headaches, syncope, dizziness, weakness, visual changes, or vomiting. Pt states there is increased pain with active ROM. Denies any hx of previous injuries or surgeries at the site. ROS    Unless otherwise stated in this report or unable to obtain because of the patient's clinical or mental status as evidenced by the medical record, this patients's positive and negative responses for Review of Systems, constitutional, psych, eyes, ENT, cardiovascular, respiratory, gastrointestinal, neurological, genitourinary, musculoskeletal, integument systems and systems related to the presenting problem are either stated in the preceding or were not pertinent or were negative for the symptoms and/or complaints related to the medical problem. Past Medical History:  has a past medical history of Asthma, Blindness, Chronic renal failure, Degeneration of cervical intervertebral disc, Depression, Diabetic peripheral neuropathy associated with type 1 diabetes mellitus ([address] Diabetic retinopathy ([address] Hyperlipidemia, Hypertension, Peripheral autonomic neuropathy due to diabetes mellitus ([address] and Type 1 diabetes mellitus ([address]   Past Surgical History:  has a past surgical history that includes vitrectomy (Right, 2009); vitrectomy (Left, 2008); transthoracic echocardiogram (4/9/13); shoulder surgery; Hysterectomy, total abdominal; and US BREAST BIOPSY W LOC DEVICE 1ST LESION RIGHT (Right, 4/6/2021). Social History:  reports that she has never smoked. She has never used smokeless tobacco. She reports previous alcohol use. She reports current drug use. Drug: Marijuana Gretta Dasen). Family History: family history includes Arthritis in her father and mother; Diabetes in her maternal cousin, maternal cousin, maternal grandmother, and maternal uncle; Heart Failure in her father; Other in her brother. Allergies: Lisinopril and Morphine    Physical Exam         VS:  BP (!) 160/74   Pulse 94   Temp 97.3 °F (36.3 °C) (Temporal)   Resp 16   Ht 5' 3\" (1.6 m)   Wt 142 lb (64.4 kg)   LMP 05/21/2014   SpO2 100%   BMI 25.15 kg/m²    Oxygen Saturation Interpretation: Normal.    Constitutional:  A&Ox3, development consistent with age, NAD. Eyes: EOMI, PERRLA. Moderate erythema and ecchymosis noted to the inferior periorbital region of the left eye. Mild TTP noted over the same area. No bleeding or drainage noted. CV: Heart RRR, no murmurs, rubs, or gallops. Lungs: CTAB without W/R/R. Wrist: Tenderness: Mild diffuse TTP noted over the left wrist, most pronounced over the ulnar aspect. Swelling: No swelling noted            Deformity: No obvious deformity noted. ROM: Decreased ROM due to pain. Increased pain with flexion and extension. Skin:  No erythema, rashes, or abrasions noted. Neurovascular: Motor deficit: /UE strength 5/5 bilaterally. No increased pain with supination or pronation of the hand. Sensory deficit:   Sensation intact proximally and distally to the injury site. Pulse deficit: 2+ and bounding. Capillary refill: Less then 2 sec throughout. Hand: Left              Tenderness: Moderate TTP noted over the PIP joint of the fifth left digit.             Swelling: Mild diffuse swelling noted over the left fifth digit. Deformity: No obvious deformity. No malrotation noted. ROM: Decreased ROM due to pain. Increased pain with flexion. Skin: There are scattered abrasions noted but no erythema or rashes. No bleeding or evidence of secondary infection noted. Neurological:  Alert and oriented. Motor functions intact. Lab / Imaging Results   (All laboratory and radiology results have been personally reviewed by myself)  Labs:  No results found for this visit on 04/08/22. Imaging: All Radiology results interpreted by Radiologist unless otherwise noted. Assessment / Plan     Impression(s):  Lucina was seen today for hand pain, wrist pain and facial pain. Diagnoses and all orders for this visit:    Fall, initial encounter  -     XR HAND LEFT (MIN 3 VIEWS); Future  -     XR WRIST LEFT (MIN 3 VIEWS); Future  -     XR ORBITS (MIN 4 VIEWS); Future    Left hand pain  -     XR HAND LEFT (MIN 3 VIEWS); Future    Left wrist pain  -     XR WRIST LEFT (MIN 3 VIEWS); Future    Periorbital pain, left  -     XR ORBITS (MIN 4 VIEWS); Future    Abrasion of finger of left hand, initial encounter    Need for Tdap vaccination      Disposition:  Disposition: Discharge to home. Orders given for x-rays of the left hand, wrist, and orbits which appeared within normal limits by my interpretation. Radiology confirmed these findings as well. Given abrasions of the left hand, Tdap was updated in office today, potential reactions discussed. RICE protocol advised. Follow-up with PCP in 1 to 2 weeks if symptoms persist.  ED sooner if symptoms worsen or change. ED immediately with any severe/worsening pain, paresthesias, ocular pain or swelling, visual changes, severe headaches, weakness, fever, nausea, or vomiting. Pt states understanding and is in agreement with this care plan. All questions answered. Tarun Bowles PA-C    **This report was transcribed using voice recognition software. Every effort was made to ensure accuracy; however, inadvertent computerized transcription errors may be present.

## 2022-04-22 ENCOUNTER — OFFICE VISIT (OUTPATIENT)
Dept: ENT CLINIC | Age: 42
End: 2022-04-22
Payer: MEDICARE

## 2022-04-22 ENCOUNTER — PROCEDURE VISIT (OUTPATIENT)
Dept: AUDIOLOGY | Age: 42
End: 2022-04-22
Payer: MEDICARE

## 2022-04-22 VITALS
DIASTOLIC BLOOD PRESSURE: 83 MMHG | BODY MASS INDEX: 24.84 KG/M2 | WEIGHT: 135 LBS | SYSTOLIC BLOOD PRESSURE: 145 MMHG | HEIGHT: 62 IN | HEART RATE: 101 BPM

## 2022-04-22 DIAGNOSIS — H90.3 SENSORINEURAL HEARING LOSS, BILATERAL: Primary | ICD-10-CM

## 2022-04-22 DIAGNOSIS — R09.82 POST-NASAL DRAINAGE: ICD-10-CM

## 2022-04-22 DIAGNOSIS — J30.9 ALLERGIC RHINITIS, UNSPECIFIED SEASONALITY, UNSPECIFIED TRIGGER: ICD-10-CM

## 2022-04-22 DIAGNOSIS — R42 DIZZINESS: ICD-10-CM

## 2022-04-22 DIAGNOSIS — H92.01 RIGHT EAR PAIN: ICD-10-CM

## 2022-04-22 DIAGNOSIS — H93.8X1 EAR PRESSURE, RIGHT: Primary | ICD-10-CM

## 2022-04-22 DIAGNOSIS — H91.8X9 ASYMMETRICAL HEARING LOSS: ICD-10-CM

## 2022-04-22 DIAGNOSIS — H90.41 SENSORINEURAL HEARING LOSS (SNHL) OF RIGHT EAR WITH UNRESTRICTED HEARING OF LEFT EAR: ICD-10-CM

## 2022-04-22 PROCEDURE — 1036F TOBACCO NON-USER: CPT | Performed by: NURSE PRACTITIONER

## 2022-04-22 PROCEDURE — G8420 CALC BMI NORM PARAMETERS: HCPCS | Performed by: NURSE PRACTITIONER

## 2022-04-22 PROCEDURE — 99203 OFFICE O/P NEW LOW 30 MIN: CPT | Performed by: NURSE PRACTITIONER

## 2022-04-22 PROCEDURE — 92557 COMPREHENSIVE HEARING TEST: CPT | Performed by: AUDIOLOGIST

## 2022-04-22 PROCEDURE — 92567 TYMPANOMETRY: CPT | Performed by: AUDIOLOGIST

## 2022-04-22 PROCEDURE — G8427 DOCREV CUR MEDS BY ELIG CLIN: HCPCS | Performed by: NURSE PRACTITIONER

## 2022-04-22 RX ORDER — FLUTICASONE PROPIONATE 50 MCG
2 SPRAY, SUSPENSION (ML) NASAL DAILY
Qty: 16 G | Refills: 1 | Status: SHIPPED | OUTPATIENT
Start: 2022-04-22

## 2022-04-22 ASSESSMENT — ENCOUNTER SYMPTOMS
STRIDOR: 0
EYES NEGATIVE: 1
RHINORRHEA: 0
SINUS PRESSURE: 0
SHORTNESS OF BREATH: 0
RESPIRATORY NEGATIVE: 1

## 2022-04-22 NOTE — PROGRESS NOTES
[facility]  Dr. Yahaira Enrique. ABDIFATAH Mayer Ms.Ed. New Consult       Patient Name:  Muna Crocker  :  1980     CHIEF C/O:    Chief Complaint   Patient presents with    Ear Problem     NP swelling and Px in Rt ear        HISTORY OBTAINED FROM:  patient    HISTORY OF PRESENT ILLNESS:       Germain Engel is a 37y.o. year old female, here today for right ear pain.     Symptoms for 1 year  Right ear pain/pressure, persistent  Worse when flying  Feels like something crawling in the ear  Denies sensation of fluid in the ear  Muffled hearing in the right ear, worse than left  Family hx of hearing loss - paternal family  No hx of recurrent earn infections or previous ear surgeries  No persistent sinus problems, mild seasonal allergy symptoms  No recent fevers or antibiotics  Denies known clenching or grinding of teeth          Past Medical 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    Chronic renal failure     Degeneration of cervical intervertebral disc     Depression     Not on medication    Diabetic peripheral neuropathy associated with type 1 diabetes mellitus ([address] 2019    Diabetic retinopathy ([address]     Hyperlipidemia     Hypertension     Peripheral autonomic neuropathy due to diabetes mellitus ([address]     Type 1 diabetes mellitus ([address]      Past Surgical History:   Procedure Laterality Date    HYSTERECTOMY, TOTAL ABDOMINAL      SHOULDER SURGERY      TRANSTHORACIC ECHOCARDIOGRAM  13    LVEF 55%    US BREAST NEEDLE BIOPSY RIGHT Right 2021    US BREAST NEEDLE BIOPSY RIGHT 2021 [facility]    VITRECTOMY Right 2009    VITRECTOMY Left        Current Outpatient Medications:     chlorhexidine (PERIDEX) 0.12 % solution, 15 mLs by Transmucosal route, Disp: , Rfl:     calcitRIOL (ROCALTROL) 0.25 MCG capsule, Take 0.25 mcg by mouth daily, Disp: , Rfl:     insulin lispro (HUMALOG) 100 UNIT/ML injection vial, 100 units/day via insulin pump (Patient taking differently: Inject into the skin 30-40units/day via insulin pump), Disp: 9 vial, Rfl: 3    medical marijuana, Take 2 mg by mouth Twice a Week., Disp: , Rfl:     olmesartan (BENICAR) 5 MG tablet, Take 5 mg by mouth daily, Disp: , Rfl:     Ferrous Gluconate (IRON 27 PO), Take 25 mg by mouth Five times weekly , Disp: , Rfl:     levocetirizine (XYZAL) 5 MG tablet, TAKE ONE TABLET BY MOUTH EVERY DAY, Disp: , Rfl: 1    Continuous Blood Gluc Sensor (DEXCOM G5 MOB/G4 PLAT SENSOR) MISC, Dexcom G5 Sensor to check blood sugars. Patient is to change q7days, Disp: 4 each, Rfl: 5    omeprazole (PRILOSEC) 40 MG delayed release capsule, Take 40 mg by mouth as needed, Disp: , Rfl:     Riboflavin (VITAMIN B-2 PO), Take 400 mg by mouth daily, Disp: , Rfl:     Magnesium 400 MG CAPS, Take by mouth daily, Disp: , Rfl:     ondansetron (ZOFRAN ODT) 4 MG disintegrating tablet, Take 1 tablet by mouth every 8 hours as needed for Nausea or Vomiting, Disp: 24 tablet, Rfl: 0    vitamin B-1 (THIAMINE) 100 MG tablet, Take 250 mg by mouth daily , Disp: , Rfl:     B complex-vitamin C-folic acid (NEPHRO-YOCASTA) 1 MG tablet, Take 1 tablet by mouth daily (with breakfast). , Disp: , Rfl:     acetaminophen (TYLENOL) 500 MG tablet, Take 500 mg by mouth every 6 hours as needed for Pain., Disp: , Rfl:   Lisinopril and Morphine  Social History     Tobacco Use    Smoking status: Never Smoker    Smokeless tobacco: Never Used   Vaping Use    Vaping Use: Never used   Substance Use Topics    Alcohol use: Not Currently     Comment: rarely    Drug use: Yes     Types: Marijuana Myrtis Regulus)     Comment: Medical Marijuana     Family History   Problem Relation Age of Onset    Arthritis Mother     Arthritis Father     Heart Failure Father     Other Brother         sarcodoma    Diabetes Maternal Uncle     Diabetes Maternal Cousin     Diabetes Maternal Cousin     Diabetes Maternal Grandmother         type 2 age onset Review of Systems   Constitutional: Negative. Negative for activity change and appetite change. HENT: Positive for ear pain (Pressure) and hearing loss (Mild r right ear). Negative for congestion, postnasal drip, rhinorrhea, sinus pressure and tinnitus. Eyes: Negative. Respiratory: Negative. Negative for shortness of breath and stridor. Cardiovascular: Negative. Negative for chest pain and palpitations. Endocrine: Negative. Musculoskeletal: Negative. Skin: Negative. Neurological: Negative. Negative for dizziness. Hematological: Negative. Psychiatric/Behavioral: Negative. BP (!) 145/83   Pulse 101   Ht 5' 2\" (1.575 m)   Wt 135 lb (61.2 kg)   LMP 05/21/2014   BMI 24.69 kg/m²   Physical Exam  Constitutional:       Appearance: Normal appearance. HENT:      Head: Normocephalic. Right Ear: Tympanic membrane, ear canal and external ear normal.      Left Ear: Tympanic membrane, ear canal and external ear normal.      Nose: Nose normal. No rhinorrhea. Right Turbinates: Pale. Left Turbinates: Pale. Mouth/Throat:      Lips: Pink. Mouth: Mucous membranes are moist.     Eyes:      Conjunctiva/sclera: Conjunctivae normal.      Pupils: Pupils are equal, round, and reactive to light. Cardiovascular:      Rate and Rhythm: Normal rate and regular rhythm. Pulses: Normal pulses. Pulmonary:      Effort: Pulmonary effort is normal. No respiratory distress. Breath sounds: No stridor. Musculoskeletal:         General: Normal range of motion. Cervical back: Normal range of motion. No rigidity. No muscular tenderness. Skin:     General: Skin is warm and dry. Neurological:      General: No focal deficit present. Mental Status: She is alert and oriented to person, place, and time. Psychiatric:         Mood and Affect: Mood normal.         Behavior: Behavior normal.         Thought Content:  Thought content normal.         Judgment: Judgment normal.                 Audiogram and tympanogram reviewed with patient. Audiogram reveals 20 dB hearing loss in the right ear with 100% discrimination at 60 dB, 15 dB of hearing loss in the left ear with 100% discrimination at 55 dB. Audiogram is asymmetrical on right. Tympanogram reveals type A curve in the right ear, with type As curve in the left ear. IMPRESSION/PLAN:    [address] was seen today for ear problem. Diagnoses and all orders for this visit:    Ear pressure, right    Post-nasal drainage    Allergic rhinitis, unspecified seasonality, unspecified trigger    Asymmetrical hearing loss    Sensorineural hearing loss (SNHL) of right ear with unrestricted hearing of left ear    Other orders  -     fluticasone (FLONASE) 50 MCG/ACT nasal spray; 2 sprays by Each Nostril route daily      Previous MRI of the brain and IAC reviewed from [facility] with no findings of abnormality within the internal auditory canal.  At this time patient will placed on Flonase, 2 sprays each nostril once daily as well as nasal saline spray, 2 sprays each nostril 3-4 times daily for hydration of the nasal sinuses, allergic rhinitis, and postnasal drainage symptoms. Patient may be suffering from intermittent eustachian tube dysfunction causing sensation of pressure in the ear when flying or laying down. Patient follow-up in 6 weeks. She is instructed to call with any new or worsening symptoms prior to her next appointment.       Kellie Milton, MSN, FNP-C  [facility], Nose and Throat    The information contained in this note has been dictated using drug and medical speech recognition software and may contain errors

## 2022-04-22 NOTE — PROGRESS NOTES
This patient was referred for audiometric/tympanometric testing by [facility] due to otalgia and hearing loss. Patient reported pain and swelling in her right ear, and problems in the past flying on airplanes with pressure changes. She reported for over the past month, she has experienced dizziness/ off-balance weekly. She denied tinnitus. Audiometry using pure tone air and bone conduction testing revealed hearing sensitivity within normal limits through 4000 Hz sloping to a mild sensorineural hearing loss in the left ear, and borderline normal/mild hearing sensitivity through 4000 Hz sloping to a mild sensorineural hearing loss in the right ear. Reliability was good. Speech reception thresholds were in good agreement with the pure tone averages, bilaterally. Speech discrimination scores were excellent, bilaterally. NOTE: asymmetry 250-3000 Hz, right ear worse. Tympanometry revealed normal middle ear peak pressure and compliance, in the right ear and normal middle ear peak pressure and shallow compliance in the left ear. The results were reviewed with the patient. Recommendations for follow up will be made pending physician consult. JUAN Siu.   Doctor of Audiology Student    Rain Patiño, Kristofer

## 2022-07-14 DIAGNOSIS — E10.65 TYPE 1 DIABETES MELLITUS WITH HYPERGLYCEMIA (HCC): Primary | ICD-10-CM

## 2022-07-29 ENCOUNTER — TELEPHONE (OUTPATIENT)
Dept: ENDOCRINOLOGY | Age: 42
End: 2022-07-29

## 2022-07-29 NOTE — TELEPHONE ENCOUNTER
Francisco Brandon called in asking if we had any reservoirs, she was running out. She did not want to order because she is in the process of getting a T-slim pump. I returned her call, we have two for her, she stated she will pick them up on Monday. During our conversation she stated she can not get her new pump until after her appointment with us in October. I did let her know we do not always have supplies on hand and she may need to order supplies so she doesn't run out before October. She stated she would go ahead and do that over the weekend.

## 2022-08-03 ENCOUNTER — TELEPHONE (OUTPATIENT)
Dept: ENDOCRINOLOGY | Age: 42
End: 2022-08-03

## 2022-08-03 NOTE — TELEPHONE ENCOUNTER
Michelle Jolly called in asking for lab orders be sent to her. She has to go to an outside facility to get the labs drawn. I have sent them out today and call her. . I had to leave a message for her.

## 2022-09-08 ENCOUNTER — SCHEDULED TELEPHONE ENCOUNTER (OUTPATIENT)
Dept: ENDOCRINOLOGY | Age: 42
End: 2022-09-08
Payer: MEDICARE

## 2022-09-08 DIAGNOSIS — E10.22 TYPE 1 DIABETES MELLITUS WITH STAGE 4 CHRONIC KIDNEY DISEASE (HCC): ICD-10-CM

## 2022-09-08 DIAGNOSIS — E10.42 TYPE 1 DIABETES MELLITUS WITH POLYNEUROPATHY (HCC): ICD-10-CM

## 2022-09-08 DIAGNOSIS — I15.0 RENOVASCULAR HYPERTENSION: ICD-10-CM

## 2022-09-08 DIAGNOSIS — E55.9 VITAMIN D DEFICIENCY: ICD-10-CM

## 2022-09-08 DIAGNOSIS — E10.319 TYPE 1 DIABETES MELLITUS WITH RETINOPATHY, MACULAR EDEMA PRESENCE UNSPECIFIED, UNSPECIFIED LATERALITY, UNSPECIFIED RETINOPATHY SEVERITY (HCC): ICD-10-CM

## 2022-09-08 DIAGNOSIS — N18.4 TYPE 1 DIABETES MELLITUS WITH STAGE 4 CHRONIC KIDNEY DISEASE (HCC): ICD-10-CM

## 2022-09-08 DIAGNOSIS — E10.65 TYPE 1 DIABETES MELLITUS WITH HYPERGLYCEMIA (HCC): Primary | ICD-10-CM

## 2022-09-08 DIAGNOSIS — Z96.41 INSULIN PUMP IN PLACE: ICD-10-CM

## 2022-09-08 PROCEDURE — 3051F HG A1C>EQUAL 7.0%<8.0%: CPT | Performed by: NURSE PRACTITIONER

## 2022-09-08 PROCEDURE — 95251 CONT GLUC MNTR ANALYSIS I&R: CPT | Performed by: NURSE PRACTITIONER

## 2022-09-08 PROCEDURE — 99214 OFFICE O/P EST MOD 30 MIN: CPT | Performed by: NURSE PRACTITIONER

## 2022-09-08 NOTE — PROGRESS NOTES
Yoselin Bernal was read the following message We want to confirm that, for purposes of billing, this is a virtual visit with your provider for which we will submit a claim for reimbursement with your insurance company. You will be responsible for any copays, coinsurance amounts or other amounts not covered by your insurance company. If you do not accept this, unfortunately we will not be able to schedule or proceed with a virtual visit with the provider. Do you accept? Cynthia Carrizales responded Yes .

## 2022-09-08 NOTE — PROGRESS NOTES
[address] [place] [address] Department of Endocrinology Diabetes and Metabolism   [address] [facility] [address]   Phone: [phone]  Fax: [phone]    Date of Service: 9/8/2022    Primary Care Physician: Alys Burkitt, DO  Referring physician: No ref. provider found  Provider: TEN Pendleton NP      Reason for the visit: Type 1 diabetes       History of Present Illness: The history is provided by the patient. No  was used. Accuracy of the patient data is excellent. Nishant Khan is a very pleasant 40 y.o. female seen today for diabetes management     Nishant Khan was diagnosed with diabetes at age 10  and currently on medtronic 56 G and dexcom . She is awaiting authorization -  T slim  should be on the new pump in a few weeks     Pt is currently Positive  for COVID    Per pt:  Current Medtronic 630 G settings  Midnight 0.675  3 am 0.65  8 AM 0.575  3 PM 0.725  8 PM 0.7    Carb ratio 7  Active insulin time 3:30  Sensitivity 80    The patient has been checking blood sugar uses dexcom   Time in range 57%  Average glucose 182        Most recent A1c results summarized below  Lab Results   Component Value Date/Time    LABA1C 7.5 03/29/2022 10:36 AM    LABA1C 7.7 08/10/2021 11:50 AM    LABA1C 7.8 02/09/2021 08:52 AM     Patient reported no hypoglycemic episodes    The patient has been mindful of what has been eating and following diabetic diet as encouraged    I reviewed current medications and the patient has no issues with diabetes medications    Last eye exam was 6/2021   ,+ diiabetic retinopathy. Dr Ariadna White.   [facility]  The patient seeing podiatrist, every 3 months   Microvascular complications:  + Retinopathy, Nephropathy , + Neuropathy   Macrovascular complications: no CAD, PVD, or Stroke    + gastroparesis     The patient refuses Flushot and pneumonia vaccine     PAST MEDICAL HISTORY   Past Medical History:   Diagnosis Date    Asthma     allergy induced    Blindness     Left eye due to Diabetes    Chronic renal failure     Degeneration of cervical intervertebral disc     Depression     Not on medication    Diabetic peripheral neuropathy associated with type 1 diabetes mellitus ([facility] 7/19/2019    Diabetic retinopathy ([facility]     Hyperlipidemia     Hypertension     Peripheral autonomic neuropathy due to diabetes mellitus ([facility]     Type 1 diabetes mellitus ([facility]        PAST SURGICAL HISTORY   Past Surgical History:   Procedure Laterality Date    SHOULDER SURGERY      TOTAL ABDOMINAL HYSTERECTOMY      TRANSTHORACIC ECHOCARDIOGRAM  4/9/13    LVEF 55%    US BREAST NEEDLE BIOPSY RIGHT Right 4/6/2021    US BREAST NEEDLE BIOPSY RIGHT 4/6/2021 [facility] ABDU BCC    VITRECTOMY Right 2009    VITRECTOMY Left 2008       SOCIAL HISTORY   Tobacco:   reports that she has never smoked. She has never used smokeless tobacco.  Alcohol:   reports that she does not currently use alcohol. Drugs:   reports current drug use. Drug: Marijuana Azar Mustard).     FAMILY HISTORY   Family History   Problem Relation Age of Onset    Arthritis Mother     Arthritis Father     Heart Failure Father     Other Brother         sarcodoma    Diabetes Maternal Uncle     Diabetes Maternal Cousin     Diabetes Maternal Cousin     Diabetes Maternal Grandmother         type 2 age onset       ALLERGIES AND DRUG REACTIONS   Allergies   Allergen Reactions    Lisinopril     Morphine Nausea And Vomiting       CURRENT MEDICATIONS   Current Outpatient Medications   Medication Sig Dispense Refill    insulin lispro (HUMALOG) 100 UNIT/ML injection vial 100 units/day via insulin pump (Patient taking differently: Inject into the skin 30-40units/day via insulin pump) 9 vial 3    fluticasone (FLONASE) 50 MCG/ACT nasal spray 2 sprays by Each Nostril route daily 16 g 1    chlorhexidine (PERIDEX) 0.12 % solution 15 mLs by Transmucosal route      calcitRIOL (ROCALTROL) 0.25 MCG capsule Take 0.25 mcg by mouth Encounters:   04/22/22 135 lb (61.2 kg)   04/08/22 142 lb (64.4 kg)   03/29/22 138 lb (62.6 kg)   11/24/21 138 lb (62.6 kg)   08/10/21 138 lb (62.6 kg)   06/28/21 135 lb (61.2 kg)     Physical examination:  Due to this being a TeleHealth encounter, evaluation of the following organ systems is limited: Vitals/Constitutional/EENT/Resp/CV/GI//MS/Neuro/Skin/Heme-Lymph-Imm. Modified physical exam through Telemedicine camera    General: Communicating well via camera   Neck: no obvious neck mass. No obvious neck deformity     CVS: no distress   Chest: no distress. Chest is moving with respiration    Extremities:  no visible tremor  Skin: No visible rashes as seen from camera   Musculoskeletal: no visible deformity  Neuro: Alert and oriented to person, place, and time. Psychiatric: Normal mood and affect.  Behavior is normal       Review of Laboratory Data:  I personally reviewed the following lab:  Lab Results   Component Value Date/Time    WBC 7.0 11/21/2018 02:11 PM    RBC 3.36 (L) 11/21/2018 02:11 PM    HGB 10.4 (L) 11/21/2018 02:11 PM    HCT 31.6 (L) 11/21/2018 02:11 PM    MCV 94.0 11/21/2018 02:11 PM    MCH 31.0 11/21/2018 02:11 PM    MCHC 32.9 11/21/2018 02:11 PM    RDW 12.4 11/21/2018 02:11 PM     11/21/2018 02:11 PM    MPV 12.1 (H) 11/21/2018 02:11 PM      Lab Results   Component Value Date/Time     11/21/2018 02:11 PM    K 4.4 11/21/2018 02:11 PM    CO2 25 11/21/2018 02:11 PM    BUN 33 (H) 11/21/2018 02:11 PM    CREATININE 2.8 (H) 11/21/2018 02:11 PM    CALCIUM 9.3 03/21/2019 12:00 AM    LABGLOM 19 11/21/2018 02:11 PM    GFRAA 23 11/21/2018 02:11 PM      No results found for: TSH, T4FREE, C7IVTTQ, FT3, B2KUBCL, TSI, TPOABS, THGAB  Lab Results   Component Value Date/Time    LABA1C 7.5 03/29/2022 10:36 AM    GLUCOSE 134 11/21/2018 02:11 PM    LABCREA 50 11/21/2018 01:45 PM     Lab Results   Component Value Date/Time    LABA1C 7.5 03/29/2022 10:36 AM    LABA1C 7.7 08/10/2021 11:50 AM    LABA1C 7.8 02/09/2021 08:52 AM     Lab Results   Component Value Date/Time    TRIG 110 06/22/2016 12:00 AM    HDL 67 06/22/2016 12:00 AM    LDLCALC 117 06/22/2016 12:00 AM    CHOL 206 06/22/2016 12:00 AM     Lab Results   Component Value Date/Time    VITD25 >120 11/21/2018 02:11 PM    VITD25 36 10/27/2015 02:35 PM       ASSESSMENT & RECOMMENDATIONS   Mary Vázquez, a 40 y.o.-old female seen in for the following issues      Diagnosis Orders   1. Type 1 diabetes mellitus with hyperglycemia (HCC)  Hemoglobin A1C      2. Insulin pump in place        3. Vitamin D deficiency        4. Renovascular hypertension        5. Type 1 diabetes mellitus with polyneuropathy (HCC)        6. Type 1 diabetes mellitus with retinopathy, macular edema presence unspecified, unspecified laterality, unspecified retinopathy severity ([address]        7. Type 1 diabetes mellitus with stage 4 chronic kidney disease (HCC)              Diabetes Mellitus Type 1     Patient's diabetes variable. Hemoglobin A1cnot available - will order   We will make the following pump adjustms  Basal: Midnight 0.675  3 am 0.65  8 AM 0.575  3 PM 0.725  8 PM 0.7    Carb ratio 12am 7,  add  6pm  6.0 once resuming eating pattern  prior to COVID   Active insulin time 3:30  Sensitivity 80  The patient counseled about the complications of uncontrolled diabetes   Patient was counselled about the importance of self-blood glucose monitoring and eating consistent carb diet to avoid blood sugar fluctuations   Labs reviewed and located in care everywhere  Insulin pump and CGM reviewed    Insulin pump in place  Insulin pump and CGM reviewed    Vitamin D deficiency  Continue vitamin D. Last vitamin D within normal limits    Renovascular hypertension  BP stable on Benicar. Following with nephrology - Dr Holger Burgess    Type 1 diabetes mellitus with polyneuropathy  Advised optimal foot care.     Following with podiatry    Type 1 diabetes mellitus with retinopathy  Following with ophthalmology    CKD Stage 4  Follows with Dr Buddy Marcus    I personally spent greater than 30 minutes reviewing external notes from PCP and other patient's care team providers, and personally interpreted labs associated with the above diagnosis. I also ordered labs to further assess and manage the above addressed medical conditions. Return in about 3 months (around 12/8/2022) for Type 1 DM. The above issues were reviewed with the patient who understood and agreed with the plan. Thank you for allowing us to participate in the care of this patient. Please do not hesitate to contact us with any additional questions. TEN Pendleton NP    [facility] Diabetes Care and Endocrinology   [address] [place] [address]   Phone: [phone]  Fax: [phone]  --------------------------------------------  An electronic signature was used to authenticate this note.  TEN Pendleton NP on 9/8/2022 at 1:24 PM

## 2022-12-08 ENCOUNTER — OFFICE VISIT (OUTPATIENT)
Dept: ENDOCRINOLOGY | Age: 42
End: 2022-12-08
Payer: MEDICARE

## 2022-12-08 VITALS
WEIGHT: 139 LBS | HEART RATE: 81 BPM | HEIGHT: 62 IN | BODY MASS INDEX: 25.58 KG/M2 | DIASTOLIC BLOOD PRESSURE: 79 MMHG | SYSTOLIC BLOOD PRESSURE: 131 MMHG

## 2022-12-08 DIAGNOSIS — E10.319 TYPE 1 DIABETES MELLITUS WITH RETINOPATHY, MACULAR EDEMA PRESENCE UNSPECIFIED, UNSPECIFIED LATERALITY, UNSPECIFIED RETINOPATHY SEVERITY (HCC): ICD-10-CM

## 2022-12-08 DIAGNOSIS — E55.9 VITAMIN D DEFICIENCY: ICD-10-CM

## 2022-12-08 DIAGNOSIS — N18.4 TYPE 1 DIABETES MELLITUS WITH STAGE 4 CHRONIC KIDNEY DISEASE (HCC): ICD-10-CM

## 2022-12-08 DIAGNOSIS — E10.65 TYPE 1 DIABETES MELLITUS WITH HYPERGLYCEMIA (HCC): Primary | ICD-10-CM

## 2022-12-08 DIAGNOSIS — Z96.41 INSULIN PUMP IN PLACE: ICD-10-CM

## 2022-12-08 DIAGNOSIS — E10.22 TYPE 1 DIABETES MELLITUS WITH STAGE 4 CHRONIC KIDNEY DISEASE (HCC): ICD-10-CM

## 2022-12-08 DIAGNOSIS — E10.42 TYPE 1 DIABETES MELLITUS WITH POLYNEUROPATHY (HCC): ICD-10-CM

## 2022-12-08 DIAGNOSIS — I15.0 RENOVASCULAR HYPERTENSION: ICD-10-CM

## 2022-12-08 PROCEDURE — G8427 DOCREV CUR MEDS BY ELIG CLIN: HCPCS | Performed by: NURSE PRACTITIONER

## 2022-12-08 PROCEDURE — 3051F HG A1C>EQUAL 7.0%<8.0%: CPT | Performed by: NURSE PRACTITIONER

## 2022-12-08 PROCEDURE — 1036F TOBACCO NON-USER: CPT | Performed by: NURSE PRACTITIONER

## 2022-12-08 PROCEDURE — 2022F DILAT RTA XM EVC RTNOPTHY: CPT | Performed by: NURSE PRACTITIONER

## 2022-12-08 PROCEDURE — G8419 CALC BMI OUT NRM PARAM NOF/U: HCPCS | Performed by: NURSE PRACTITIONER

## 2022-12-08 PROCEDURE — G8484 FLU IMMUNIZE NO ADMIN: HCPCS | Performed by: NURSE PRACTITIONER

## 2022-12-08 PROCEDURE — 99214 OFFICE O/P EST MOD 30 MIN: CPT | Performed by: NURSE PRACTITIONER

## 2022-12-08 NOTE — PROGRESS NOTES
[address] [place] [address] Department of Endocrinology Diabetes and Metabolism   [address] [facility] [address]   Phone: [phone]  Fax: [phone]    Date of Service: 12/8/2022    Primary Care Physician: Rebekah Sexton DO  Referring physician: No ref. provider found  Provider: TEN Thomas NP      Reason for the visit: Type 1 diabetes       History of Present Illness: The history is provided by the patient. No  was used. Accuracy of the patient data is excellent. Renee Mcmahon is a very pleasant 40 y.o. female seen today for diabetes management     Renee Mcmahon was diagnosed with diabetes at age 10  and currently on Tandem insulin pump started 12/1/22 and dexcom . She was on Medtronic insulin pump prior  Current pump settings:   Midnight 0.650  3 am 0.625  8 AM 0.575  3 PM 0.725  8 PM 0.675  Goal 110   Carb ratio 7  Active insulin time 3:30  Sensitivity 80    The patient has been checking blood sugar uses dexcom   Time in range 70%  Average glucose 151  Hyperglycemia 29%  Low 1%  TDD 14.93        Most recent A1c results summarized below  Lab Results   Component Value Date/Time    LABA1C 7.5 03/29/2022 10:36 AM    LABA1C 7.7 08/10/2021 11:50 AM    LABA1C 7.8 02/09/2021 08:52 AM     Patient reported no hypoglycemic episodes    The patient has been mindful of what has been eating and following diabetic diet as encouraged    I reviewed current medications and the patient has no issues with diabetes medications    Last eye exam was 6/2021   ,+ diiabetic retinopathy. Dr Bianca Adams.   [facility]  The patient seeing podiatrist, every 3 months   Microvascular complications:  + Retinopathy, Nephropathy , + Neuropathy   Macrovascular complications: no CAD, PVD, or Stroke    + gastroparesis     The patient refuses Flushot and pneumonia vaccine     PAST MEDICAL HISTORY   Past Medical History:   Diagnosis Date    Asthma     allergy induced Blindness     Left eye due to Diabetes    Chronic renal failure     Degeneration of cervical intervertebral disc     Depression     Not on medication    Diabetic peripheral neuropathy associated with type 1 diabetes mellitus ([facility] 7/19/2019    Diabetic retinopathy ([facility]     Hyperlipidemia     Hypertension     Peripheral autonomic neuropathy due to diabetes mellitus ([facility]     Type 1 diabetes mellitus ([facility]        PAST SURGICAL HISTORY   Past Surgical History:   Procedure Laterality Date    HYSTERECTOMY, TOTAL ABDOMINAL (CERVIX REMOVED)      SHOULDER SURGERY      TRANSTHORACIC ECHOCARDIOGRAM  4/9/13    LVEF 55%    US BREAST NEEDLE BIOPSY RIGHT Right 4/6/2021    US BREAST NEEDLE BIOPSY RIGHT 4/6/2021 [facility] ABDU BCC    VITRECTOMY Right 2009    VITRECTOMY Left 2008       SOCIAL HISTORY   Tobacco:   reports that she has never smoked. She has never used smokeless tobacco.  Alcohol:   reports that she does not currently use alcohol. Drugs:   reports current drug use. Drug: Marijuana Kenard Snooks). FAMILY HISTORY   Family History   Problem Relation Age of Onset    Arthritis Mother     Arthritis Father     Heart Failure Father     Other Brother         sarcodoma    Diabetes Maternal Uncle     Diabetes Maternal Cousin     Diabetes Maternal Cousin     Diabetes Maternal Grandmother         type 2 age onset       ALLERGIES AND DRUG REACTIONS   Allergies   Allergen Reactions    Lisinopril     Morphine Nausea And Vomiting       CURRENT MEDICATIONS   Current Outpatient Medications   Medication Sig Dispense Refill    insulin lispro (HUMALOG) 100 UNIT/ML injection vial 100 units/day via insulin pump (Patient taking differently: Inject into the skin 30-40units/day via insulin pump) 9 vial 3    Continuous Blood Gluc Sensor (DEXCOM G5 MOB/G4 PLAT SENSOR) MISC Dexcom G5 Sensor to check blood sugars.  Patient is to change q7days 4 each 5    fluticasone (FLONASE) 50 MCG/ACT nasal spray 2 sprays by Each Nostril route daily 16 g 1 chlorhexidine (PERIDEX) 0.12 % solution 15 mLs by Transmucosal route      calcitRIOL (ROCALTROL) 0.25 MCG capsule Take 0.25 mcg by mouth daily      medical marijuana Take 2 mg by mouth Twice a Week. olmesartan (BENICAR) 5 MG tablet Take 5 mg by mouth daily      Ferrous Gluconate (IRON 27 PO) Take 25 mg by mouth Five times weekly       levocetirizine (XYZAL) 5 MG tablet TAKE ONE TABLET BY MOUTH EVERY DAY  1    omeprazole (PRILOSEC) 40 MG delayed release capsule Take 40 mg by mouth as needed      Riboflavin (VITAMIN B-2 PO) Take 400 mg by mouth daily      Magnesium 400 MG CAPS Take by mouth daily      ondansetron (ZOFRAN ODT) 4 MG disintegrating tablet Take 1 tablet by mouth every 8 hours as needed for Nausea or Vomiting 24 tablet 0    vitamin B-1 (THIAMINE) 100 MG tablet Take 250 mg by mouth daily       B complex-vitamin C-folic acid (NEPHRO-YOCASTA) 1 MG tablet Take 1 tablet by mouth daily (with breakfast). acetaminophen (TYLENOL) 500 MG tablet Take 500 mg by mouth every 6 hours as needed for Pain. No current facility-administered medications for this visit. Review of Systems  Constitutional: No fever, no chills, no diaphoresis, no generalized weakness. HEENT: No blurred vision, No sore throat, no ear pain, no hair loss  Neck: denied any neck swelling, difficulty swallowing,   Cardio-pulmonary: No CP, SOB or palpitation, No orthopnea or PND. No cough or wheezing. GI: No N/V/D, no constipation, No abdominal pain, no melena or hematochezia   : Denied any dysuria, hematuria, flank pain, discharge, or incontinence. Skin: denied any rash, ulcer, Hirsute, or hyperpigmentation. MSK: denied any joint deformity, joint pain/swelling, muscle pain, or back pain.   Neuro: no numbness, no tingling, no weakness    OBJECTIVE    /79   Pulse 81   Ht 5' 2\" (1.575 m)   Wt 139 lb (63 kg)   LMP 05/21/2014   BMI 25.42 kg/m²   BP Readings from Last 4 Encounters:   12/08/22 131/79   04/22/22 (!) 145/83 04/08/22 (!) 160/74   03/29/22 (!) 146/86     Wt Readings from Last 6 Encounters:   12/08/22 139 lb (63 kg)   04/22/22 135 lb (61.2 kg)   04/08/22 142 lb (64.4 kg)   03/29/22 138 lb (62.6 kg)   11/24/21 138 lb (62.6 kg)   08/10/21 138 lb (62.6 kg)     Physical examination:  Due to this being a TeleHealth encounter, evaluation of the following organ systems is limited: Vitals/Constitutional/EENT/Resp/CV/GI//MS/Neuro/Skin/Heme-Lymph-Imm. Modified physical exam through Telemedicine camera    General: Communicating well via camera   Neck: no obvious neck mass. No obvious neck deformity     CVS: no distress   Chest: no distress. Chest is moving with respiration    Extremities:  no visible tremor  Skin: No visible rashes as seen from camera   Musculoskeletal: no visible deformity  Neuro: Alert and oriented to person, place, and time. Psychiatric: Normal mood and affect.  Behavior is normal       Review of Laboratory Data:  I personally reviewed the following lab:  Lab Results   Component Value Date/Time    WBC 7.0 11/21/2018 02:11 PM    RBC 3.36 (L) 11/21/2018 02:11 PM    HGB 10.4 (L) 11/21/2018 02:11 PM    HCT 31.6 (L) 11/21/2018 02:11 PM    MCV 94.0 11/21/2018 02:11 PM    MCH 31.0 11/21/2018 02:11 PM    MCHC 32.9 11/21/2018 02:11 PM    RDW 12.4 11/21/2018 02:11 PM     11/21/2018 02:11 PM    MPV 12.1 (H) 11/21/2018 02:11 PM      Lab Results   Component Value Date/Time     11/21/2018 02:11 PM    K 4.4 11/21/2018 02:11 PM    CO2 25 11/21/2018 02:11 PM    BUN 33 (H) 11/21/2018 02:11 PM    CREATININE 2.8 (H) 11/21/2018 02:11 PM    CALCIUM 9.3 03/21/2019 12:00 AM    LABGLOM 19 11/21/2018 02:11 PM    GFRAA 23 11/21/2018 02:11 PM      No results found for: TSH, T4FREE, E6PWNHN, FT3, T7SEYFK, TSI, TPOABS, THGAB  Lab Results   Component Value Date/Time    LABA1C 7.5 03/29/2022 10:36 AM    GLUCOSE 134 11/21/2018 02:11 PM    LABCREA 50 11/21/2018 01:45 PM     Lab Results   Component Value Date/Time    LABA1C 7.5 03/29/2022 10:36 AM    LABA1C 7.7 08/10/2021 11:50 AM    LABA1C 7.8 02/09/2021 08:52 AM     Lab Results   Component Value Date/Time    TRIG 110 06/22/2016 12:00 AM    HDL 67 06/22/2016 12:00 AM    LDLCALC 117 06/22/2016 12:00 AM    CHOL 206 06/22/2016 12:00 AM     Lab Results   Component Value Date/Time    VITD25 >120 11/21/2018 02:11 PM    VITD25 36 10/27/2015 02:35 PM       ASSESSMENT & RECOMMENDATIONS   Charles Bradley, a 40 y.o.-old female seen in for the following issues      Diagnosis Orders   1. Type 1 diabetes mellitus with hyperglycemia (HCC)        2. Type 1 diabetes mellitus with stage 4 chronic kidney disease ([facility]        3. Insulin pump in place        4. Vitamin D deficiency        5. Renovascular hypertension        6. Type 1 diabetes mellitus with polyneuropathy (HCC)        7. Type 1 diabetes mellitus with retinopathy, macular edema presence unspecified, unspecified laterality, unspecified retinopathy severity ([facility]                Diabetes Mellitus Type 1     Patient's diabetes variable. Pt started Tandem insulin pump last  12/1/22 with Dexcom   Hemoglobin A1c   We will make the following pump adjustments, Midnight 0.650, 3 am 0.625, 8 AM 0.575, 3 PM 0.725  8 PM 0.675, CR12am  7, add 12pm  6.7, 3 pm 7, ISF 80, BS Goal 110 , Active insulin time 3:30  The patient counseled about the complications of uncontrolled diabetes   Patient was counselled about the importance of self-blood glucose monitoring and eating consistent carb diet to avoid blood sugar fluctuations   Labs reviewed and located in care everywhere  Insulin pump and CGM reviewed    Insulin pump in place  Insulin pump and CGM reviewed    Vitamin D deficiency  Continue vitamin D. Last vitamin D within normal limits    Renovascular hypertension  BP stable on Benicar. Following with nephrology - Dr María Lombardi    Type 1 diabetes mellitus with polyneuropathy  Advised optimal foot care.     Following with podiatry    Type 1 diabetes mellitus with retinopathy  Following with ophthalmology    CKD Stage 4  Follows with Dr Lianne Cueva    I personally spent greater than 30 minutes reviewing external notes from PCP and other patient's care team providers, and personally interpreted labs associated with the above diagnosis. I also ordered labs to further assess and manage the above addressed medical conditions. Return in about 3 months (around 3/8/2023) for Type 1 DM. The above issues were reviewed with the patient who understood and agreed with the plan. Thank you for allowing us to participate in the care of this patient. Please do not hesitate to contact us with any additional questions. TEN Vuong NP [facility] Diabetes Care and Endocrinology   [address] [place] [address]   Phone: [phone]  Fax: [phone]  --------------------------------------------  An electronic signature was used to authenticate this note.  TEN Vuong NP on 12/8/2022 at 1:32 PM

## 2022-12-14 DIAGNOSIS — N18.4 TYPE 1 DIABETES MELLITUS WITH STAGE 4 CHRONIC KIDNEY DISEASE (HCC): ICD-10-CM

## 2022-12-14 DIAGNOSIS — E10.22 TYPE 1 DIABETES MELLITUS WITH STAGE 4 CHRONIC KIDNEY DISEASE (HCC): ICD-10-CM

## 2022-12-14 DIAGNOSIS — Z96.41 INSULIN PUMP IN PLACE: ICD-10-CM

## 2023-03-08 ENCOUNTER — OFFICE VISIT (OUTPATIENT)
Dept: ENDOCRINOLOGY | Age: 43
End: 2023-03-08
Payer: MEDICARE

## 2023-03-08 VITALS
RESPIRATION RATE: 16 BRPM | OXYGEN SATURATION: 97 % | HEART RATE: 81 BPM | BODY MASS INDEX: 24.11 KG/M2 | DIASTOLIC BLOOD PRESSURE: 88 MMHG | SYSTOLIC BLOOD PRESSURE: 149 MMHG | HEIGHT: 62 IN | WEIGHT: 131 LBS

## 2023-03-08 DIAGNOSIS — E10.42 TYPE 1 DIABETES MELLITUS WITH POLYNEUROPATHY (HCC): ICD-10-CM

## 2023-03-08 DIAGNOSIS — Z96.41 INSULIN PUMP IN PLACE: ICD-10-CM

## 2023-03-08 DIAGNOSIS — N18.4 TYPE 1 DIABETES MELLITUS WITH STAGE 4 CHRONIC KIDNEY DISEASE (HCC): ICD-10-CM

## 2023-03-08 DIAGNOSIS — E10.22 TYPE 1 DIABETES MELLITUS WITH STAGE 4 CHRONIC KIDNEY DISEASE (HCC): ICD-10-CM

## 2023-03-08 DIAGNOSIS — E10.65 TYPE 1 DIABETES MELLITUS WITH HYPERGLYCEMIA (HCC): Primary | ICD-10-CM

## 2023-03-08 DIAGNOSIS — E10.319 TYPE 1 DIABETES MELLITUS WITH RETINOPATHY, MACULAR EDEMA PRESENCE UNSPECIFIED, UNSPECIFIED LATERALITY, UNSPECIFIED RETINOPATHY SEVERITY (HCC): ICD-10-CM

## 2023-03-08 DIAGNOSIS — E55.9 VITAMIN D DEFICIENCY: ICD-10-CM

## 2023-03-08 DIAGNOSIS — I15.0 RENOVASCULAR HYPERTENSION: ICD-10-CM

## 2023-03-08 PROCEDURE — 3046F HEMOGLOBIN A1C LEVEL >9.0%: CPT | Performed by: NURSE PRACTITIONER

## 2023-03-08 PROCEDURE — G8427 DOCREV CUR MEDS BY ELIG CLIN: HCPCS | Performed by: NURSE PRACTITIONER

## 2023-03-08 PROCEDURE — G8484 FLU IMMUNIZE NO ADMIN: HCPCS | Performed by: NURSE PRACTITIONER

## 2023-03-08 PROCEDURE — 1036F TOBACCO NON-USER: CPT | Performed by: NURSE PRACTITIONER

## 2023-03-08 PROCEDURE — G8420 CALC BMI NORM PARAMETERS: HCPCS | Performed by: NURSE PRACTITIONER

## 2023-03-08 PROCEDURE — 2022F DILAT RTA XM EVC RTNOPTHY: CPT | Performed by: NURSE PRACTITIONER

## 2023-03-08 PROCEDURE — 99214 OFFICE O/P EST MOD 30 MIN: CPT | Performed by: NURSE PRACTITIONER

## 2023-03-08 NOTE — PROGRESS NOTES
[address] [place] [address] Department of Endocrinology Diabetes and Metabolism   [address] [facility] [address]   Phone: [phone]  Fax: [phone]    Date of Service: 3/8/2023    Primary Care Physician: Lukas Griffith DO  Referring physician: No ref. provider found  Provider: TEN Alcala NP      Reason for the visit: Type 1 diabetes       History of Present Illness: The history is provided by the patient. No  was used. Accuracy of the patient data is excellent. Chris Archibald is a very pleasant 40 y.o. female seen today for diabetes management     Chris Archibald was diagnosed with diabetes at age 10  and currently on Tandem insulin pump started 12/1/22 and dexcom . She was on Medtronic insulin pump prior    She adjusted her pump schedule on her own since last OV     Basal Midnight 0.60, 3 am 0.60, 8 AM 0.55, 12pm 0.55, 3pm 0.625, 8pm 0.60, 11pm 0.575   CR 12 am 8, 3am  8, 12pm 8,3pm 8, 11pm 8 ISF 12am 80,3 am 90,  8 am 90, 12pm 90, 8pm 80, 11pm  80, BS Goal 110, Active insulin time 3:30    The patient has been checking blood sugar uses dexcom   Time in range 64%  Average glucose 175  Hyperglycemia 36%  Low 0%  TDD 27.07 units        A1c 6.8% on 3/2/23 per care everywhere    Most recent A1c results summarized below  Lab Results   Component Value Date/Time    LABA1C 7.5 03/29/2022 10:36 AM    LABA1C 7.7 08/10/2021 11:50 AM    LABA1C 7.8 02/09/2021 08:52 AM     Patient reported no hypoglycemic episodes    The patient has been mindful of what has been eating and following diabetic diet as encouraged    I reviewed current medications and the patient has no issues with diabetes medications    Last eye exam was 6/2021   ,+ diiabetic retinopathy. Dr Dragan Good.   [facility]  The patient seeing podiatrist, every 3 months   Microvascular complications:  + Retinopathy, Nephropathy , + Neuropathy   Macrovascular complications: no CAD, PVD, or Stroke    + gastroparesis     The patient refuses Flushot and pneumonia vaccine     PAST MEDICAL HISTORY   Past Medical History:   Diagnosis Date    Asthma     allergy induced    Blindness     Left eye due to Diabetes    Chronic renal failure     Degeneration of cervical intervertebral disc     Depression     Not on medication    Diabetic peripheral neuropathy associated with type 1 diabetes mellitus ([facility] [address] 7/19/2019    Diabetic retinopathy ([facility] [address]     Hyperlipidemia     Hypertension     Peripheral autonomic neuropathy due to diabetes mellitus ([address]     Type 1 diabetes mellitus ([address]        PAST SURGICAL HISTORY   Past Surgical History:   Procedure Laterality Date    HYSTERECTOMY, TOTAL ABDOMINAL (CERVIX REMOVED)      SHOULDER SURGERY      TRANSTHORACIC ECHOCARDIOGRAM  4/9/13    LVEF 55%    US BREAST BIOPSY W LOC DEVICE 1ST LESION RIGHT Right 4/6/2021    US BREAST NEEDLE BIOPSY RIGHT 4/6/2021 [facility]    VITRECTOMY Right 2009    VITRECTOMY Left 2008       SOCIAL HISTORY   Tobacco:   reports that she has never smoked. She has never used smokeless tobacco.  Alcohol:   reports that she does not currently use alcohol. Drugs:   reports current drug use. Drug: Marijuana [place]).     FAMILY HISTORY   Family History   Problem Relation Age of Onset    Arthritis Mother     Arthritis Father     Heart Failure Father     Other Brother         sarcodoma    Diabetes Maternal Uncle     Diabetes Maternal Cousin     Diabetes Maternal Cousin     Diabetes Maternal Grandmother         type 2 age onset       ALLERGIES AND DRUG REACTIONS   Allergies   Allergen Reactions    Lisinopril     Morphine Nausea And Vomiting       CURRENT MEDICATIONS   Current Outpatient Medications   Medication Sig Dispense Refill    insulin lispro (HUMALOG) 100 UNIT/ML injection vial 100 units/day via insulin pump 30 mL 5    fluticasone (FLONASE) 50 MCG/ACT nasal spray 2 sprays by Each Nostril route daily 16 g 1    chlorhexidine (PERIDEX) 0.12 % solution 15 mLs by Transmucosal route      medical marijuana Take 2 mg by mouth Twice a Week. olmesartan (BENICAR) 5 MG tablet Take 5 mg by mouth daily      Ferrous Gluconate (IRON 27 PO) Take 25 mg by mouth Five times weekly       levocetirizine (XYZAL) 5 MG tablet TAKE ONE TABLET BY MOUTH EVERY DAY  1    Continuous Blood Gluc Sensor (DEXCOM G5 MOB/G4 PLAT SENSOR) MISC Dexcom G5 Sensor to check blood sugars. Patient is to change q7days 4 each 5    omeprazole (PRILOSEC) 40 MG delayed release capsule Take 40 mg by mouth as needed      Riboflavin (VITAMIN B-2 PO) Take 400 mg by mouth daily      Magnesium 400 MG CAPS Take by mouth daily      ondansetron (ZOFRAN ODT) 4 MG disintegrating tablet Take 1 tablet by mouth every 8 hours as needed for Nausea or Vomiting 24 tablet 0    vitamin B-1 (THIAMINE) 100 MG tablet Take 250 mg by mouth daily       B complex-vitamin C-folic acid (NEPHRO-YOCASTA) 1 MG tablet Take 1 tablet by mouth daily (with breakfast). acetaminophen (TYLENOL) 500 MG tablet Take 500 mg by mouth every 6 hours as needed for Pain. calcitRIOL (ROCALTROL) 0.25 MCG capsule Take 0.25 mcg by mouth daily       No current facility-administered medications for this visit. Review of Systems  Constitutional: No fever, no chills, no diaphoresis, no generalized weakness. HEENT: No blurred vision, No sore throat, no ear pain, no hair loss  Neck: denied any neck swelling, difficulty swallowing,   Cardio-pulmonary: No CP, SOB or palpitation, No orthopnea or PND. No cough or wheezing. GI: No N/V/D, no constipation, No abdominal pain, no melena or hematochezia   : Denied any dysuria, hematuria, flank pain, discharge, or incontinence. Skin: denied any rash, ulcer, Hirsute, or hyperpigmentation. MSK: denied any joint deformity, joint pain/swelling, muscle pain, or back pain.   Neuro: no numbness, no tingling, no weakness    OBJECTIVE    BP (!) 149/88   Pulse 81   Resp 16   Ht 5' 2\" (1.575 m)   Wt 131 lb (59.4 kg)   LMP 05/21/2014   SpO2 97%   BMI 23.96 kg/m²   BP Readings from Last 4 Encounters:   03/08/23 (!) 149/88   12/08/22 131/79   04/22/22 (!) 145/83   04/08/22 (!) 160/74     Wt Readings from Last 6 Encounters:   03/08/23 131 lb (59.4 kg)   12/08/22 139 lb (63 kg)   04/22/22 135 lb (61.2 kg)   04/08/22 142 lb (64.4 kg)   03/29/22 138 lb (62.6 kg)   11/24/21 138 lb (62.6 kg)     Physical examination:  Due to this being a TeleHealth encounter, evaluation of the following organ systems is limited: Vitals/Constitutional/EENT/Resp/CV/GI//MS/Neuro/Skin/Heme-Lymph-Imm. Modified physical exam through Telemedicine camera    General: Communicating well via camera   Neck: no obvious neck mass. No obvious neck deformity     CVS: no distress   Chest: no distress. Chest is moving with respiration    Extremities:  no visible tremor  Skin: No visible rashes as seen from camera   Musculoskeletal: no visible deformity  Neuro: Alert and oriented to person, place, and time. Psychiatric: Normal mood and affect.  Behavior is normal       Review of Laboratory Data:  I personally reviewed the following lab:  Lab Results   Component Value Date/Time    WBC 7.0 11/21/2018 02:11 PM    RBC 3.36 (L) 11/21/2018 02:11 PM    HGB 10.4 (L) 11/21/2018 02:11 PM    HCT 31.6 (L) 11/21/2018 02:11 PM    MCV 94.0 11/21/2018 02:11 PM    MCH 31.0 11/21/2018 02:11 PM    MCHC 32.9 11/21/2018 02:11 PM    RDW 12.4 11/21/2018 02:11 PM     11/21/2018 02:11 PM    MPV 12.1 (H) 11/21/2018 02:11 PM      Lab Results   Component Value Date/Time     11/21/2018 02:11 PM    K 4.4 11/21/2018 02:11 PM    CO2 25 11/21/2018 02:11 PM    BUN 33 (H) 11/21/2018 02:11 PM    CREATININE 2.8 (H) 11/21/2018 02:11 PM    CALCIUM 9.3 03/21/2019 12:00 AM    LABGLOM 19 11/21/2018 02:11 PM    GFRAA 23 11/21/2018 02:11 PM      No results found for: TSH, T4FREE, F5TVHUC, FT3, X0ASJSB, TSI, TPOABS, THGAB  Lab Results   Component Value Date/Time    LABA1C 7.5 03/29/2022 10:36 AM    GLUCOSE 134 11/21/2018 02:11 PM    LABCREA 50 11/21/2018 01:45 PM     Lab Results   Component Value Date/Time    LABA1C 7.5 03/29/2022 10:36 AM    LABA1C 7.7 08/10/2021 11:50 AM    LABA1C 7.8 02/09/2021 08:52 AM     Lab Results   Component Value Date/Time    TRIG 110 06/22/2016 12:00 AM    HDL 67 06/22/2016 12:00 AM    LDLCALC 117 06/22/2016 12:00 AM    CHOL 206 06/22/2016 12:00 AM     Lab Results   Component Value Date/Time    VITD25 >120 11/21/2018 02:11 PM    VITD25 36 10/27/2015 02:35 PM       ASSESSMENT & RECOMMENDATIONS   Chris Archibald, a 40 y.o.-old female seen in for the following issues      Diagnosis Orders   1. Type 1 diabetes mellitus with hyperglycemia (HCC)        2. Insulin pump in place        3. Vitamin D deficiency        4. Renovascular hypertension        5. Type 1 diabetes mellitus with polyneuropathy (HCC)        6. Type 1 diabetes mellitus with stage 4 chronic kidney disease ([address]        7.  Type 1 diabetes mellitus with retinopathy, macular edema presence unspecified, unspecified laterality, unspecified retinopathy severity ([address]                  Diabetes Mellitus Type 1     Patient's diabetes A1c 6.8%  Pt started Tandem insulin pump last  12/1/22 with Dexcom   Pt is interested in Omnipod 5 -> will give literature to review   We will make no  following pump adjustments: per pt request    Basal Midnight 0.60, 3 am 0.60, 8 AM 0.55, 12pm 0.55, 3pm 0.625, 8pm 0.60, 11pm 0.575   CR 12 am 8, 3am  8, 12pm 8,3pm 8, 11pm 8 ISF 12am 80,3 am 90,  8 am 90, 12pm 90, 8pm 80, 11pm  80, BS Goal 110, Active insulin time 3:30  The patient counseled about the complications of uncontrolled diabetes   Patient was counselled about the importance of self-blood glucose monitoring and eating consistent carb diet to avoid blood sugar fluctuations   Labs reviewed and located in care everywhere  Insulin pump and CGM reviewed    Continuous Glucose Monitoring (CGM) download and interpretation   I personally reviewed and interpreted continuous glucose monitor (CGM) download. CGM report was discussed with patient including blood glucose patterns, percentages of blood glucose at goal, above goal and below goal. Insulin dosages/antidiabetic regimen was adjusted according to CGM download. Full CGM was scanned under media. Insulin pump in place  Insulin pump and CGM reviewed    Vitamin D deficiency  Continue vitamin D. Last vitamin D within normal limits    Renovascular hypertension  BP stable on Benicar. Following with nephrology - Dr Treva Horvath    Type 1 diabetes mellitus with polyneuropathy  Advised optimal foot care. Following with podiatry    Type 1 diabetes mellitus with retinopathy  Following with ophthalmology    CKD Stage 3  Follows with Dr Tilley Cancer  GFR 19  Pt states she does not qualify for transplant and not interested in HD/PD   She is aware of end of life if no renal replacement therapy is not pursued. I personally spent greater than 30 minutes reviewing external notes from PCP and other patient's care team providers, and personally interpreted labs associated with the above diagnosis. I also ordered labs to further assess and manage the above addressed medical conditions. Return in about 3 months (around 6/8/2023) for Type 1 DM . The above issues were reviewed with the patient who understood and agreed with the plan. Thank you for allowing us to participate in the care of this patient. Please do not hesitate to contact us with any additional questions. TEN Middleton NP    [facility] - BEHAVIORAL HEALTH SERVICES Diabetes Care and Endocrinology   [address] [facility] [address]   Phone: [phone]  Fax: [phone]  --------------------------------------------  An electronic signature was used to authenticate this note.  TEN Middleton NP on 3/8/2023 at 1:54 PM

## 2023-03-24 ENCOUNTER — OFFICE VISIT (OUTPATIENT)
Dept: ORTHOPEDIC SURGERY | Age: 43
End: 2023-03-24

## 2023-03-24 VITALS — WEIGHT: 130 LBS | HEIGHT: 62 IN | BODY MASS INDEX: 23.92 KG/M2

## 2023-03-24 DIAGNOSIS — M25.552 LEFT HIP PAIN: ICD-10-CM

## 2023-03-24 DIAGNOSIS — M25.859 FEMOROACETABULAR IMPINGEMENT: Primary | ICD-10-CM

## 2023-03-24 RX ORDER — INSULIN LISPRO 100 [IU]/ML
INJECTION, SOLUTION INTRAVENOUS; SUBCUTANEOUS
COMMUNITY
Start: 2023-01-18

## 2023-03-24 RX ORDER — THIAMINE HCL 100 MG
TABLET ORAL
COMMUNITY
Start: 2022-05-26

## 2023-03-24 NOTE — Clinical Note
Sending her your way---looks like she self decompressed her rim, large alpha angle, highly suspect labral tear. Got MRI to assess for cartilage health to ensure hip scope would be right call.

## 2023-03-24 NOTE — PROGRESS NOTES
referral to Orthopedic Surgery      2. Left hip pain  XR HIP 2-3 VW W PELVIS LEFT          PLAN:  Patient is a pleasant 45-year-old female who presents to the clinic today for evaluation of left hip pain that began approximately 1 month ago with no known mechanism of injury. She states this morning she heard a loud pop in the hip and was unable to bear weight. On exam today she is tender to palpation at her ASIS minimal tenderness to palpation at the greater trochanter. She is painful and slightly decreased range of motion on the left hip as compared to the right hip. She is a painful Charan. I did obtain AP pelvis and 2 view left hip x-rays in the office today which shows a rim fragment, large alpha angle with minimal degenerative changes. Her alpha angle is approximately 73 degrees. At this time, I explained to patient I suspect that she has a labral tear based on the large cam lesion seen on imaging. I did recommend we get an MRI of the left hip to assess for labral tear and any other internal derangement as she is having difficulty with weightbearing at this time. I did give her crutches to help with weightbearing, she can be WBAT with crutches. Her hip is doing irritable on exam today to tolerate any kind of home exercises or physical therapy. Unfortunately, she is a insulin-dependent diabetic with chronic kidney disease and is unable to take oral steroids or anti-inflammatories to help manage this discomfort. She states she does have medical marijuana and edibles help with her pain. She states that she can take an edible when she gets home later today to help. I did advise her to rest for the next 5-7 days. If her symptoms would increase, or she would develop any numbness, tingling, loss of sensation or radiation of symptoms into the toes, general numbness, change in bowel or bladder habits she would need to seek immediate medical attention by going to the local emergency room.   Ultimately, I

## 2023-04-03 ENCOUNTER — TELEPHONE (OUTPATIENT)
Dept: ORTHOPEDIC SURGERY | Age: 43
End: 2023-04-03

## 2023-04-03 ENCOUNTER — HOSPITAL ENCOUNTER (OUTPATIENT)
Dept: MRI IMAGING | Age: 43
Discharge: HOME | End: 2023-04-05
Payer: MEDICARE

## 2023-04-03 DIAGNOSIS — M25.859 FEMOROACETABULAR IMPINGEMENT: ICD-10-CM

## 2023-04-03 PROCEDURE — 73721 MRI JNT OF LWR EXTRE W/O DYE: CPT

## 2023-04-03 NOTE — TELEPHONE ENCOUNTER
MRI shows mild OA of the hip with labral tear. Recommend f/u with Dr. Toñito Koenig as discussed in the office.

## 2023-04-24 ENCOUNTER — OFFICE VISIT (OUTPATIENT)
Dept: ORTHOPEDIC SURGERY | Age: 43
End: 2023-04-24

## 2023-04-24 VITALS — BODY MASS INDEX: 24.11 KG/M2 | HEIGHT: 62 IN | WEIGHT: 131 LBS

## 2023-04-24 DIAGNOSIS — M25.859 FEMOROACETABULAR IMPINGEMENT: Primary | ICD-10-CM

## 2023-04-24 NOTE — PROGRESS NOTES
[facility] HEALTH   ORTHOPAEDIC SURGERY AND SPORTS MEDICINE  DATE OF VISIT: 04/24/23  New Hip Patient     Referring Provider:   Mary Marti PA-C  [address] [facility],  [address] [facility]    CHIEF COMPLAINT:   Chief Complaint   Patient presents with    Hip Pain     Ref Geoffrey Davila [facility] - left Femoroacetabular impingement -x3 weeks, she states was carrying 50lb feed bag, felt a pop ; bothersome ADLs, unable to take medication stage 5 kidney failure - takes tyl as needs - she is also a type 1 diabetic     Pain     Left hip - 7 / 10    Results     Left hi XR + MRI         HPI:      Igor Mahan is a 40y.o. year old female who is seen today  for evaluation of left hip pain. She reports the pain has been ongoing for the past 3 weeks. She does recall a specific injury which started the pain. Patient states that while carrying a 50 pound bag of animal feed she felt a pop in her hip. Denies history of left hip pain. She was seen for initially at a walk-in care and [place] by Beck. Currently ambulating unassisted. She denies feelings of instability or mechanical symptoms. The patient is not working. The patient is on disability. She is currently in stage IV kidney failure secondary to diabetes. 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   She recently had a MRI of her left hip completed    PAST MEDICAL HISTORY  Past Medical History:   Diagnosis Date    Asthma     allergy induced    Blindness     Left eye due to Diabetes    Chronic renal failure     Degeneration of cervical intervertebral disc     Depression     Not on medication    Diabetic peripheral neuropathy associated with type 1 diabetes mellitus ([address] 7/19/2019    Diabetic retinopathy ([address]     Hyperlipidemia     Hypertension     Peripheral autonomic neuropathy due to diabetes mellitus ([address]     Type 1 diabetes mellitus ([address]        PAST SURGICAL HISTORY  Past Surgical History:   Procedure

## 2023-06-12 ENCOUNTER — TELEPHONE (OUTPATIENT)
Dept: ENDOCRINOLOGY | Age: 43
End: 2023-06-12

## 2023-06-12 NOTE — TELEPHONE ENCOUNTER
Patient called she stated she has not feeling good since you changed the setting on her pump.   She just not feeling well, she stated she is going to stop in tomorrow with her pump to download again to see if we need to make adjustments

## 2023-06-28 ENCOUNTER — OFFICE VISIT (OUTPATIENT)
Dept: ORTHOPEDIC SURGERY | Age: 43
End: 2023-06-28

## 2023-06-28 VITALS — BODY MASS INDEX: 23.92 KG/M2 | HEIGHT: 62 IN | WEIGHT: 130 LBS

## 2023-06-28 DIAGNOSIS — M25.552 LEFT HIP PAIN: Primary | ICD-10-CM

## 2023-06-28 PROCEDURE — 1036F TOBACCO NON-USER: CPT | Performed by: ORTHOPAEDIC SURGERY

## 2023-07-03 DIAGNOSIS — E10.65 TYPE 1 DIABETES MELLITUS WITH HYPERGLYCEMIA (HCC): ICD-10-CM

## 2023-07-03 LAB
ANION GAP SERPL CALCULATED.3IONS-SCNC: 12 MMOL/L (ref 7–16)
BUN SERPL-MCNC: 51 MG/DL (ref 6–20)
CALCIUM SERPL-MCNC: 9.3 MG/DL (ref 8.6–10.2)
CHLORIDE SERPL-SCNC: 101 MMOL/L (ref 98–107)
CO2 SERPL-SCNC: 24 MMOL/L (ref 22–29)
CREAT SERPL-MCNC: 3.1 MG/DL (ref 0.5–1)
GLUCOSE SERPL-MCNC: 339 MG/DL (ref 74–99)
POTASSIUM SERPL-SCNC: 4.9 MMOL/L (ref 3.5–5)
SODIUM SERPL-SCNC: 137 MMOL/L (ref 132–146)

## 2023-07-06 LAB — C PEPTIDE SERPL-MCNC: <0.1 NG/ML (ref 0.5–3.3)

## 2023-07-07 ENCOUNTER — TELEPHONE (OUTPATIENT)
Dept: ENDOCRINOLOGY | Age: 43
End: 2023-07-07

## 2023-07-07 NOTE — TELEPHONE ENCOUNTER
Please forward the recent result for C-peptide to [facility] because she is this patient before

## 2023-10-11 ENCOUNTER — OFFICE VISIT (OUTPATIENT)
Dept: ENDOCRINOLOGY | Age: 43
End: 2023-10-11

## 2023-10-11 VITALS
HEIGHT: 63 IN | RESPIRATION RATE: 18 BRPM | OXYGEN SATURATION: 99 % | HEART RATE: 97 BPM | WEIGHT: 137 LBS | DIASTOLIC BLOOD PRESSURE: 89 MMHG | SYSTOLIC BLOOD PRESSURE: 126 MMHG | BODY MASS INDEX: 24.27 KG/M2

## 2023-10-11 DIAGNOSIS — I15.0 RENOVASCULAR HYPERTENSION: ICD-10-CM

## 2023-10-11 DIAGNOSIS — E10.42 TYPE 1 DIABETES MELLITUS WITH POLYNEUROPATHY (HCC): ICD-10-CM

## 2023-10-11 DIAGNOSIS — E10.22 TYPE 1 DIABETES MELLITUS WITH STAGE 4 CHRONIC KIDNEY DISEASE (HCC): ICD-10-CM

## 2023-10-11 DIAGNOSIS — E10.65 TYPE 1 DIABETES MELLITUS WITH HYPERGLYCEMIA (HCC): Primary | ICD-10-CM

## 2023-10-11 DIAGNOSIS — N18.4 TYPE 1 DIABETES MELLITUS WITH STAGE 4 CHRONIC KIDNEY DISEASE (HCC): ICD-10-CM

## 2023-10-11 DIAGNOSIS — L70.8 OTHER ACNE: ICD-10-CM

## 2023-10-11 DIAGNOSIS — E55.9 VITAMIN D DEFICIENCY: ICD-10-CM

## 2023-10-11 DIAGNOSIS — Z96.41 INSULIN PUMP IN PLACE: ICD-10-CM

## 2023-10-11 NOTE — PROGRESS NOTES
Patient was counselled about the importance of self-blood glucose monitoring and eating consistent carb diet to avoid blood sugar fluctuations   Labs reviewed and located in care everywhere  Insulin pump and CGM reviewed    Continuous Glucose Monitoring (CGM) download and interpretation   I personally reviewed and interpreted continuous glucose monitor (CGM) download. CGM report was discussed with patient including blood glucose patterns, percentages of blood glucose at goal, above goal and below goal. Insulin dosages/antidiabetic regimen was adjusted according to CGM download. Full CGM was scanned under media. Insulin pump in place  Insulin pump and CGM reviewed    Vitamin D deficiency  Continue vitamin D. Last vitamin D within normal limits    Renovascular hypertension  On Benicar. Following with nephrology - Dr Blanca Bazan    Type 1 diabetes mellitus with polyneuropathy  Advised optimal foot care. Following with podiatry    Type 1 diabetes mellitus with retinopathy  Following with ophthalmology    CKD Stage 3  Follows with Dr Blanca Bazan  GFR 17 on 9/26/23  Pt states she does not qualify for transplant and not interested in HD/PD   She is aware of end of life if no renal replacement therapy is not pursued. Acne  Will check Vit A    I personally spent greater than 30 minutes reviewing external notes from PCP and other patient's care team providers, and personally interpreted labs associated with the above diagnosis. I also ordered labs to further assess and manage the above addressed medical conditions. Return in about 3 months (around 1/11/2024) for Type 1 DM . The above issues were reviewed with the patient who understood and agreed with the plan. Thank you for allowing us to participate in the care of this patient. Please do not hesitate to contact us with any additional questions.      [address], TEN - NP    [facility] - BEHAVIORAL HEALTH SERVICES Diabetes Care and Endocrinology   [address] [facility] [place],

## 2023-11-27 ENCOUNTER — TELEPHONE (OUTPATIENT)
Dept: ENDOCRINOLOGY | Age: 43
End: 2023-11-27

## 2023-11-27 NOTE — TELEPHONE ENCOUNTER
Pt lm on clinical line regarding Omnipod registration and training. Spoke w pt - she wanted to know if the office needed to do anything regarding Omnipod registration process, I let pt know we do not. She should contact Dina Garcia the  with Omnipod - pt stated she already has the number and will reach out.

## 2023-11-30 RX ORDER — ACYCLOVIR 400 MG/1
TABLET ORAL
Qty: 9 EACH | Refills: 4 | Status: SHIPPED | OUTPATIENT
Start: 2023-11-30

## 2023-12-15 ENCOUNTER — TELEPHONE (OUTPATIENT)
Dept: ENDOCRINOLOGY | Age: 43
End: 2023-12-15

## 2023-12-15 NOTE — TELEPHONE ENCOUNTER
Patient called and is having problems where she is putting infusion sets in. She is wondering if she is allergic to the humalog. She is getting lumps where she is putting in the infusion set, severe itching. She is wondering what to do?

## 2024-01-12 LAB
25(OH)D3 SERPL-MCNC: 58.6 NG/ML (ref 30–100)
ALBUMIN SERPL-MCNC: 4.1 G/DL (ref 3.5–5.2)
ALP SERPL-CCNC: 81 U/L (ref 35–104)
ALT SERPL-CCNC: 7 U/L (ref 0–32)
ANION GAP SERPL CALCULATED.3IONS-SCNC: 14 MMOL/L (ref 7–16)
AST SERPL-CCNC: 15 U/L (ref 0–31)
BASOPHILS # BLD: 0.06 K/UL (ref 0–0.2)
BASOPHILS NFR BLD: 1 % (ref 0–2)
BILIRUB SERPL-MCNC: 0.3 MG/DL (ref 0–1.2)
BILIRUB UR QL STRIP: NEGATIVE
BUN SERPL-MCNC: 29 MG/DL (ref 6–20)
CALCIUM SERPL-MCNC: 9.1 MG/DL (ref 8.6–10.2)
CHLORIDE SERPL-SCNC: 102 MMOL/L (ref 98–107)
CLARITY UR: CLEAR
CO2 SERPL-SCNC: 22 MMOL/L (ref 22–29)
COLOR UR: YELLOW
CREAT SERPL-MCNC: 3.2 MG/DL (ref 0.5–1)
CREAT UR-MCNC: 74.9 MG/DL (ref 29–226)
CREAT UR-MCNC: 76.9 MG/DL (ref 29–226)
EOSINOPHIL # BLD: 0.63 K/UL (ref 0.05–0.5)
EOSINOPHILS RELATIVE PERCENT: 11 % (ref 0–6)
ERYTHROCYTE [DISTWIDTH] IN BLOOD BY AUTOMATED COUNT: 12.7 % (ref 11.5–15)
GFR SERPL CREATININE-BSD FRML MDRD: 18 ML/MIN/1.73M2
GLUCOSE SERPL-MCNC: 225 MG/DL (ref 74–99)
GLUCOSE UR STRIP-MCNC: NEGATIVE MG/DL
HCT VFR BLD AUTO: 36.2 % (ref 34–48)
HGB BLD-MCNC: 11.5 G/DL (ref 11.5–15.5)
HGB UR QL STRIP.AUTO: NEGATIVE
IMM GRANULOCYTES # BLD AUTO: <0.03 K/UL (ref 0–0.58)
IMM GRANULOCYTES NFR BLD: 0 % (ref 0–5)
KETONES UR STRIP-MCNC: NEGATIVE MG/DL
LEUKOCYTE ESTERASE UR QL STRIP: NEGATIVE
LYMPHOCYTES NFR BLD: 1.99 K/UL (ref 1.5–4)
LYMPHOCYTES RELATIVE PERCENT: 35 % (ref 20–42)
MAGNESIUM SERPL-MCNC: 2.1 MG/DL (ref 1.6–2.6)
MCH RBC QN AUTO: 31.1 PG (ref 26–35)
MCHC RBC AUTO-ENTMCNC: 31.8 G/DL (ref 32–34.5)
MCV RBC AUTO: 97.8 FL (ref 80–99.9)
MICROALBUMIN UR-MCNC: 392 MG/L (ref 0–19)
MICROALBUMIN/CREAT UR-RTO: 509 MCG/MG CREAT (ref 0–30)
MONOCYTES NFR BLD: 0.39 K/UL (ref 0.1–0.95)
MONOCYTES NFR BLD: 7 % (ref 2–12)
NEUTROPHILS NFR BLD: 46 % (ref 43–80)
NEUTS SEG NFR BLD: 2.66 K/UL (ref 1.8–7.3)
NITRITE UR QL STRIP: NEGATIVE
PH UR STRIP: 6 [PH] (ref 5–9)
PHOSPHATE SERPL-MCNC: 3.3 MG/DL (ref 2.5–4.5)
PLATELET # BLD AUTO: 141 K/UL (ref 130–450)
PMV BLD AUTO: 11.7 FL (ref 7–12)
POTASSIUM SERPL-SCNC: 4.8 MMOL/L (ref 3.5–5)
PROT SERPL-MCNC: 6.9 G/DL (ref 6.4–8.3)
PROT UR STRIP-MCNC: 30 MG/DL
PTH-INTACT SERPL-MCNC: 146.3 PG/ML (ref 15–65)
RBC # BLD AUTO: 3.7 M/UL (ref 3.5–5.5)
RBC #/AREA URNS HPF: ABNORMAL /HPF
SODIUM SERPL-SCNC: 138 MMOL/L (ref 132–146)
SP GR UR STRIP: 1.01 (ref 1–1.03)
TOTAL PROTEIN, URINE: 60 MG/DL (ref 0–12)
URATE SERPL-MCNC: 6.8 MG/DL (ref 2.4–5.7)
URINE TOTAL PROTEIN CREATININE RATIO: 0.8 (ref 0–0.2)
UROBILINOGEN UR STRIP-ACNC: 0.2 EU/DL (ref 0–1)
WBC #/AREA URNS HPF: ABNORMAL /HPF
WBC OTHER # BLD: 5.7 K/UL (ref 4.5–11.5)

## 2024-01-16 ENCOUNTER — TELEMEDICINE (OUTPATIENT)
Dept: ENDOCRINOLOGY | Age: 44
End: 2024-01-16

## 2024-01-16 DIAGNOSIS — E55.9 VITAMIN D DEFICIENCY: ICD-10-CM

## 2024-01-16 DIAGNOSIS — I15.0 RENOVASCULAR HYPERTENSION: ICD-10-CM

## 2024-01-16 DIAGNOSIS — Z96.41 INSULIN PUMP IN PLACE: ICD-10-CM

## 2024-01-16 DIAGNOSIS — E10.42 TYPE 1 DIABETES MELLITUS WITH POLYNEUROPATHY (HCC): ICD-10-CM

## 2024-01-16 DIAGNOSIS — N18.4 TYPE 1 DIABETES MELLITUS WITH STAGE 4 CHRONIC KIDNEY DISEASE (HCC): ICD-10-CM

## 2024-01-16 DIAGNOSIS — E10.65 TYPE 1 DIABETES MELLITUS WITH HYPERGLYCEMIA (HCC): Primary | ICD-10-CM

## 2024-01-16 DIAGNOSIS — E10.319 TYPE 1 DIABETES MELLITUS WITH RETINOPATHY, MACULAR EDEMA PRESENCE UNSPECIFIED, UNSPECIFIED LATERALITY, UNSPECIFIED RETINOPATHY SEVERITY (HCC): ICD-10-CM

## 2024-01-16 DIAGNOSIS — E10.22 TYPE 1 DIABETES MELLITUS WITH STAGE 4 CHRONIC KIDNEY DISEASE (HCC): ICD-10-CM

## 2024-01-16 NOTE — PROGRESS NOTES
TeleMedicine Patient Consent    This visit was performed as a virtual video visit using a synchronous, two-way, audio-video telehealth technology platform. Patient identification was verified at the start of the visit, including the patient's telephone number and physical location. I discussed with the patient the nature of our telehealth visits, that:     Due to the nature of an audio- video modality, the only components of a physical exam that could be done are the elements supported by direct observation.  The provider will evaluate the patient and recommend diagnostics and treatments based on their assessment.  If it was felt that the patient should be evaluated in clinic or an emergency room setting, then they would be directed there.  Our sessions are not being recorded and that personal health information is protected.  Our team would provide follow up care in person if/when the patient needs it.       Patient does agree to proceed with telemedicine consultation.    Patient location: home address in [place]    Physician location: regular office location    This visit was completed virtually using My Chart/Haiku/Shilpi    This visit was performed during the 2020 public health crisis and COVID-19 pandemic.  *Add 95 modifier to all Video Visits*  
tablet by mouth daily (with breakfast)      Calcium Citrate-Vitamin D3 315-6.25 MG-MCG TABS Take by mouth      insulin lispro (HUMALOG) 100 UNIT/ML injection vial 100 units/day via insulin pump 30 mL 5    medical marijuana Take 2 mg by mouth Twice a Week.      olmesartan (BENICAR) 5 MG tablet Take 1 tablet by mouth daily      Ferrous Gluconate (IRON 27 PO) Take 25 mg by mouth Five times weekly       levocetirizine (XYZAL) 5 MG tablet TAKE ONE TABLET BY MOUTH EVERY DAY  1    Continuous Blood Gluc Sensor (DEXCOM G5 MOB/G4 PLAT SENSOR) MISC Dexcom G5 Sensor to check blood sugars. Patient is to change q7days 4 each 5    omeprazole (PRILOSEC) 40 MG delayed release capsule Take 1 capsule by mouth as needed      Riboflavin (VITAMIN B-2 PO) Take 400 mg by mouth daily      Magnesium 400 MG CAPS Take by mouth daily      ondansetron (ZOFRAN ODT) 4 MG disintegrating tablet Take 1 tablet by mouth every 8 hours as needed for Nausea or Vomiting 24 tablet 0    acetaminophen (TYLENOL) 500 MG tablet Take 1 tablet by mouth every 6 hours as needed for Pain      psyllium (KONSYL) 28.3 % PACK Take 1 packet by mouth daily      calcitRIOL (ROCALTROL) 0.25 MCG capsule Take 1 capsule by mouth daily      vitamin B-1 (THIAMINE) 100 MG tablet Take 2.5 tablets by mouth daily      B complex-vitamin C-folic acid (NEPHRO-YOCASTA) 1 MG tablet Take 1 tablet by mouth daily (with breakfast) (Patient not taking: Reported on 10/11/2023)       No current facility-administered medications for this visit.       Review of Systems  Constitutional: No fever, no chills, no diaphoresis, no generalized weakness.  HEENT: No blurred vision, No sore throat, no ear pain, no hair loss  Neck: denied any neck swelling, difficulty swallowing,   Cardio-pulmonary: No CP, SOB or palpitation, No orthopnea or PND. No cough or wheezing.  GI: No N/V/D, no constipation, No abdominal pain, no melena or hematochezia   : Denied any dysuria, hematuria, flank pain, discharge, or 
Back Pain

## 2024-02-06 ENCOUNTER — TELEMEDICINE (OUTPATIENT)
Dept: OBSTETRICS AND GYNECOLOGY | Facility: CLINIC | Age: 44
End: 2024-02-06
Payer: MEDICARE

## 2024-02-06 VITALS — BODY MASS INDEX: 23.92 KG/M2 | WEIGHT: 130 LBS | HEIGHT: 62 IN

## 2024-02-06 DIAGNOSIS — N95.1 MENOPAUSAL SYMPTOMS: ICD-10-CM

## 2024-02-06 DIAGNOSIS — R53.83 OTHER FATIGUE: Primary | ICD-10-CM

## 2024-02-06 DIAGNOSIS — L65.9 HAIR THINNING: ICD-10-CM

## 2024-02-06 PROCEDURE — 99442 PR PHYS/QHP TELEPHONE EVALUATION 11-20 MIN: CPT | Performed by: OBSTETRICS & GYNECOLOGY

## 2024-02-06 PROCEDURE — 1036F TOBACCO NON-USER: CPT | Performed by: OBSTETRICS & GYNECOLOGY

## 2024-02-06 RX ORDER — FERROUS GLUCONATE 256(28)MG
28 TABLET ORAL
COMMUNITY

## 2024-02-06 RX ORDER — INSULIN LISPRO 100 [IU]/ML
INJECTION, SOLUTION INTRAVENOUS; SUBCUTANEOUS
COMMUNITY

## 2024-02-06 RX ORDER — MAGNESIUM 200 MG
TABLET ORAL
COMMUNITY

## 2024-02-06 RX ORDER — LEVOCETIRIZINE DIHYDROCHLORIDE 5 MG/1
TABLET, FILM COATED ORAL EVERY EVENING
COMMUNITY

## 2024-02-06 RX ORDER — OLMESARTAN MEDOXOMIL 5 MG/1
5 TABLET ORAL DAILY
COMMUNITY

## 2024-02-06 RX ORDER — FERROUS SULFATE, DRIED 160(50) MG
1 TABLET, EXTENDED RELEASE ORAL DAILY
COMMUNITY

## 2024-02-06 RX ORDER — CYANOCOBALAMIN (VITAMIN B-12) 250 MCG
250 TABLET ORAL DAILY
COMMUNITY

## 2024-02-06 RX ORDER — INSULIN PMP CART,AUT,G6/7,CNTR
EACH SUBCUTANEOUS
COMMUNITY

## 2024-02-06 ASSESSMENT — ENCOUNTER SYMPTOMS: ENDOCRINE COMMENTS: HAIR THIN

## 2024-02-06 NOTE — PROGRESS NOTES
Subjective   Patient ID: Renata Becker is a 43 y.o. female who presents for hormone issues (Wants to discuss hormone issues).  Petra is a 43 years old G0 prolonged history of diabetes and kidney failure who is on the phone visit with chief complaint of feeling excessive fatigue, dryness of mouth within the last month.  She states that she has noticed a significant change in her energy level within the last month.  She also reports some night sweats but no significant hot flashes.  She says she does have some mood swings but is not unusual for her but she has noticed her hair is thinning.  She says that she brought this to her primary care but they recommended that she contacts me.  She says she still has 1 ovary after her hysterectomy and has had regular blood work including hemoglobin A1c which was told it was within normal range but she has orders to have these repeated again soon.  She denies any changes in her medication recently.      Review of Systems   Endocrine: Positive for heat intolerance.        Hair thin       Objective   Physical Exam  Pulmonary:      Effort: Pulmonary effort is normal.   Neurological:      Mental Status: She is alert.   Psychiatric:         Mood and Affect: Mood normal.         Thought Content: Thought content normal.         Judgment: Judgment normal.         Assessment/Plan   Problem List Items Addressed This Visit    None  Visit Diagnoses         Codes    Other fatigue    -  Primary R53.83    Relevant Orders    TSH with reflex to Free T4 if abnormal    FSH & LH    Estradiol    Testosterone,Free and Total    Menopausal symptoms     N95.1    Relevant Orders    TSH with reflex to Free T4 if abnormal    FSH & LH    Estradiol    Hair thinning     L65.9    Relevant Orders    Zinc, Serum or Plasma        Her conversation and manage I recommend some blood work including FSH LH and estradiol level as well as test testosterone and TSH level.  She also wanted zinc level checked.  I  will call her with the results and the procedure recommendation when asked all of these are back.         Sabina Momin MD 02/06/24 11:42 AM

## 2024-02-09 DIAGNOSIS — E10.65 TYPE 1 DIABETES MELLITUS WITH HYPERGLYCEMIA (HCC): ICD-10-CM

## 2024-02-09 LAB
ESTRADIOL LEVEL: 76.3 PG/ML
FSH SERPL-ACNC: 6.5 MIU/ML
LH SERPL-ACNC: 20.5 MIU/ML
TSH SERPL DL<=0.05 MIU/L-ACNC: 1.22 UIU/ML (ref 0.27–4.2)

## 2024-02-12 LAB — ZINC SERPL-MCNC: 71.4 UG/DL (ref 60–120)

## 2024-02-13 LAB
SHBG SERPL-SCNC: 165 NMOL/L (ref 30–135)
TESTOST FREE MFR SERPL: 2.1 PG/ML (ref 1.1–5.8)
TESTOST SERPL-MCNC: 39 NG/DL (ref 20–70)

## 2024-03-05 ENCOUNTER — OFFICE VISIT (OUTPATIENT)
Dept: FAMILY MEDICINE CLINIC | Age: 44
End: 2024-03-05
Payer: MEDICARE

## 2024-03-05 VITALS
HEIGHT: 63 IN | SYSTOLIC BLOOD PRESSURE: 110 MMHG | WEIGHT: 141 LBS | OXYGEN SATURATION: 98 % | HEART RATE: 88 BPM | TEMPERATURE: 98.2 F | BODY MASS INDEX: 24.98 KG/M2 | DIASTOLIC BLOOD PRESSURE: 72 MMHG

## 2024-03-05 DIAGNOSIS — R30.0 DYSURIA: Primary | ICD-10-CM

## 2024-03-05 DIAGNOSIS — R10.30 LOWER ABDOMINAL PAIN: ICD-10-CM

## 2024-03-05 LAB
BILIRUBIN, POC: ABNORMAL
BLOOD URINE, POC: ABNORMAL
CLARITY, POC: ABNORMAL
COLOR, POC: YELLOW
GLUCOSE URINE, POC: ABNORMAL
LEUKOCYTE EST, POC: ABNORMAL
NITRITE, POC: ABNORMAL
PROTEIN, POC: 100
SPECIFIC GRAVITY, POC: 1.01
UROBILINOGEN, POC: 0.2

## 2024-03-05 PROCEDURE — 81002 URINALYSIS NONAUTO W/O SCOPE: CPT

## 2024-03-05 PROCEDURE — 99214 OFFICE O/P EST MOD 30 MIN: CPT

## 2024-03-05 NOTE — PROGRESS NOTES
Chief Complaint       Dysuria      History of Present Illness   Source of history provided by:  patient.      Lucina Ma is a 43 y.o. old female presenting to the walk in clinic for evaluation of dysuria x 2 months. Patient states symptoms have worsened over past 2 days. Reports associated frequency, urgency, lower abdominal pain, suprapubic pressure, nausea, lethargy, chills, left lower back pain, and left flank pain. Localizes abdominal pain to to LLQ and RLQ and describes it as constant. Reports that left lower back pain and left flank pain is constant as well. Patient relays that she unsure if she has had gross hematuria due to vision problems. Denies any fever, chills, vaginal discharge, vaginal bleeding, vomiting, diarrhea, or rash. Denies any STI exposure. Explains that she has been in a monogamous relationship for 8 years. Patient had a total abdominal hysterectomy, but states that she still has her right ovary. Patient adds that her sugars have been higher at home over the past few days. She relays that her glucose was 140 this morning prior to eating. Patient states she had to lay in bed all day yesterday because she was feeling so lethargic. Patient's last menstrual period was 05/21/2014.      ROS    Unless otherwise stated in this report or unable to obtain because of the patient's clinical or mental status as evidenced by the medical record, this patients's positive and negative responses for Review of Systems, constitutional, psych, eyes, ENT, cardiovascular, respiratory, gastrointestinal, neurological, genitourinary, musculoskeletal, integument systems and systems related to the presenting problem are either stated in the preceding or were not pertinent or were negative for the symptoms and/or complaints related to the medical problem.    Past Medical History:  has a past medical history of Asthma, Blindness, Chronic renal failure, Degeneration of cervical intervertebral disc, Depression,

## 2024-03-07 LAB
CULTURE: NORMAL
SPECIMEN DESCRIPTION: NORMAL

## 2024-03-26 DIAGNOSIS — E10.65 TYPE 1 DIABETES MELLITUS WITH HYPERGLYCEMIA (HCC): Primary | ICD-10-CM

## 2024-03-26 DIAGNOSIS — E10.65 TYPE 1 DIABETES MELLITUS WITH HYPERGLYCEMIA (HCC): ICD-10-CM

## 2024-03-26 LAB — HBA1C MFR BLD: 6.3 % (ref 4–5.6)

## 2024-03-29 ENCOUNTER — TELEPHONE (OUTPATIENT)
Dept: ENDOCRINOLOGY | Age: 44
End: 2024-03-29

## 2024-03-29 NOTE — TELEPHONE ENCOUNTER
Patient called is going on vacation tomorrow and needs humalog insulin for her pump. Called into [facility] 30 ml with 5 refills

## 2024-04-08 ENCOUNTER — TELEPHONE (OUTPATIENT)
Dept: OBSTETRICS AND GYNECOLOGY | Facility: CLINIC | Age: 44
End: 2024-04-08

## 2024-04-08 ENCOUNTER — APPOINTMENT (OUTPATIENT)
Dept: OBSTETRICS AND GYNECOLOGY | Facility: CLINIC | Age: 44
End: 2024-04-08
Payer: MEDICARE

## 2024-04-08 ENCOUNTER — OFFICE VISIT (OUTPATIENT)
Dept: OBSTETRICS AND GYNECOLOGY | Facility: CLINIC | Age: 44
End: 2024-04-08
Payer: MEDICARE

## 2024-04-08 VITALS — SYSTOLIC BLOOD PRESSURE: 124 MMHG | BODY MASS INDEX: 26.16 KG/M2 | DIASTOLIC BLOOD PRESSURE: 74 MMHG | WEIGHT: 143 LBS

## 2024-04-08 DIAGNOSIS — Z12.31 ENCOUNTER FOR SCREENING MAMMOGRAM FOR MALIGNANT NEOPLASM OF BREAST: Primary | ICD-10-CM

## 2024-04-08 DIAGNOSIS — N89.8 VAGINAL DRYNESS: ICD-10-CM

## 2024-04-08 PROCEDURE — 99213 OFFICE O/P EST LOW 20 MIN: CPT | Performed by: OBSTETRICS & GYNECOLOGY

## 2024-04-08 PROCEDURE — 1036F TOBACCO NON-USER: CPT | Performed by: OBSTETRICS & GYNECOLOGY

## 2024-04-08 RX ORDER — ESTRADIOL 0.1 MG/G
2 CREAM VAGINAL NIGHTLY
Qty: 34 G | Refills: 3 | Status: SHIPPED | OUTPATIENT
Start: 2024-04-08 | End: 2025-04-08

## 2024-04-08 ASSESSMENT — ENCOUNTER SYMPTOMS: UNEXPECTED WEIGHT CHANGE: 1

## 2024-04-08 NOTE — PROGRESS NOTES
Subjective   Patient ID: Renata Becker is a 43 y.o. female who presents for bloodwork (Patient here to discuss her bloodwork that was done through [facility]).  HPIHere  to review results.   C/O vaginal dryness and painful sex also weight gain despite no change in diet and activity and good BS control.   Occasional hot flashes.   She says she had a bad kidney infection in 1/2024 which was the reason for her BS abnormalities but she sees her kidney specialist and her endocrinologist regularly.   She is planning to move to [place] to be close to her father and is concerned with finding a reliable and good endocrinologist to manage her blood sugars.        Review of Systems   Constitutional:  Positive for unexpected weight change.   Genitourinary:  Positive for dyspareunia and vaginal pain.       Objective   Physical Exam  Pulmonary:      Effort: Pulmonary effort is normal.   Neurological:      Mental Status: She is alert.   Psychiatric:         Mood and Affect: Mood normal.         Behavior: Behavior normal.         Thought Content: Thought content normal.         Judgment: Judgment normal.         Assessment/Plan   Problem List Items Addressed This Visit    None  Visit Diagnoses         Codes    Encounter for screening mammogram for malignant neoplasm of breast    -  Primary Z12.31    Relevant Orders    BI mammo bilateral screening tomosynthesis    Vaginal dryness     N89.8    Relevant Medications    estradiol (Estrace) 0.01 % (0.1 mg/gram) vaginal cream        We reviewed her recent labs and does not show she is menopausal.   All levels within normal limits.   We discussed that she may still have symptoms due to perimenopause and fluctulation in hormone levels.  Also other causes such as sugar abnormalities, and stress or illness can cause hot flashes as well.  She ased for OTC remedies and some were suggested to her.   I also discuss possible low dose estrogen patch weekly and or topical estrogen vaginal cream  and she wants to try this.  Estradiol cream was escribed.   Follow up as needed or in 6 months will do yearly pap then.          Sabina Momin MD 04/08/24 4:33 PM

## 2024-06-04 ENCOUNTER — TELEPHONE (OUTPATIENT)
Dept: ENDOCRINOLOGY | Age: 44
End: 2024-06-04

## 2024-06-04 NOTE — TELEPHONE ENCOUNTER
Pt called to schedule her follow up with [facility].  She was seen in Jan and due back in .  Pt is scheduled at the next opening in Oct.    She states that she will need refills.

## 2024-06-05 ENCOUNTER — OFFICE VISIT (OUTPATIENT)
Dept: ENDOCRINOLOGY | Age: 44
End: 2024-06-05

## 2024-06-05 VITALS
OXYGEN SATURATION: 100 % | DIASTOLIC BLOOD PRESSURE: 79 MMHG | BODY MASS INDEX: 26.68 KG/M2 | HEART RATE: 88 BPM | WEIGHT: 145 LBS | HEIGHT: 62 IN | SYSTOLIC BLOOD PRESSURE: 144 MMHG

## 2024-06-05 DIAGNOSIS — I15.0 RENOVASCULAR HYPERTENSION: ICD-10-CM

## 2024-06-05 DIAGNOSIS — Z96.41 INSULIN PUMP IN PLACE: ICD-10-CM

## 2024-06-05 DIAGNOSIS — E10.319 TYPE 1 DIABETES MELLITUS WITH RETINOPATHY, MACULAR EDEMA PRESENCE UNSPECIFIED, UNSPECIFIED LATERALITY, UNSPECIFIED RETINOPATHY SEVERITY (HCC): ICD-10-CM

## 2024-06-05 DIAGNOSIS — E10.65 TYPE 1 DIABETES MELLITUS WITH HYPERGLYCEMIA (HCC): Primary | ICD-10-CM

## 2024-06-05 DIAGNOSIS — E55.9 VITAMIN D DEFICIENCY: ICD-10-CM

## 2024-06-05 DIAGNOSIS — E10.22 TYPE 1 DIABETES MELLITUS WITH STAGE 4 CHRONIC KIDNEY DISEASE (HCC): ICD-10-CM

## 2024-06-05 DIAGNOSIS — N18.4 TYPE 1 DIABETES MELLITUS WITH STAGE 4 CHRONIC KIDNEY DISEASE (HCC): ICD-10-CM

## 2024-06-05 DIAGNOSIS — E10.42 TYPE 1 DIABETES MELLITUS WITH POLYNEUROPATHY (HCC): ICD-10-CM

## 2024-06-05 LAB
ESTIMATED AVERAGE GLUCOSE: NORMAL
HBA1C MFR BLD: 6.7 %

## 2024-06-05 NOTE — PROGRESS NOTES
[facility] [employer]  [facility] Department of Endocrinology Diabetes and Metabolism   [address], [place] [address]  Phone: [phone]  Fax: [phone]    Date of Service: 6/5/2024    Primary Care Physician: Justo Moore III, DO  Referring physician: No ref. provider found  Provider: TEN Sharp - CNS      Reason for the visit: Type 1 diabetes       History of Present Illness:  The history is provided by the patient. No  was used. Accuracy of the patient data is excellent.  Lucina Ma is a very pleasant 43 y.o. female seen today for diabetes management     Lucina Ma was diagnosed with diabetes at age 8  and currently on omipod 5     And CGM         Basal Midnight 0.65, 8 am 0.6,  3pm 0.65 , 8pm 0.6, 11pm 0.60   CR 12 am 7, 3am  8, 8 am 7, ISF 12am 75,3 am 80,  3pm 75, 8pm 70, BS Goal 120, Active insulin time 3:00    The patient has been checking blood sugar uses dexcom   Ambulatory continuous glucose monitoring of interstitial tissue fluid via a subcutaneous sensor for a minimum of 72 hours; analysis, interpretation and report    Time in range 64%  Average glucose 168  Hyperglycemia 33%  Low 3%                  Most recent A1c results summarized below  Lab Results   Component Value Date/Time    LABA1C 6.7 06/05/2024 12:00 AM    LABA1C 6.3 03/26/2024 01:17 PM    LABA1C 7.5 03/29/2022 10:36 AM     Patient reported some  hypoglycemic episodes    The patient has been mindful of what has been eating and following diabetic diet as encouraged    I reviewed current medications and the patient has no issues with diabetes medications    Last eye exam was 6/2021   ,+ diabetic retinopathy.  Dr Loya.  [facility]  The patient seeing podiatrist, every 3 months   Microvascular complications:  + Retinopathy, + Nephropathy , + Neuropathy   Macrovascular complications: no CAD, PVD, or Stroke    + gastroparesis     The patient refuses

## 2024-10-02 ENCOUNTER — OFFICE VISIT (OUTPATIENT)
Dept: ENDOCRINOLOGY | Age: 44
End: 2024-10-02

## 2024-10-02 VITALS — HEIGHT: 61 IN | BODY MASS INDEX: 26.43 KG/M2 | WEIGHT: 140 LBS

## 2024-10-02 DIAGNOSIS — E10.65 TYPE 1 DIABETES MELLITUS WITH HYPERGLYCEMIA (HCC): Primary | ICD-10-CM

## 2024-10-02 DIAGNOSIS — I15.0 RENOVASCULAR HYPERTENSION: ICD-10-CM

## 2024-10-02 DIAGNOSIS — E55.9 VITAMIN D DEFICIENCY: ICD-10-CM

## 2024-10-02 DIAGNOSIS — N18.5 TYPE 1 DIABETES MELLITUS WITH STAGE 5 CHRONIC KIDNEY DISEASE NOT ON CHRONIC DIALYSIS (HCC): ICD-10-CM

## 2024-10-02 DIAGNOSIS — E10.22 TYPE 1 DIABETES MELLITUS WITH STAGE 5 CHRONIC KIDNEY DISEASE NOT ON CHRONIC DIALYSIS (HCC): ICD-10-CM

## 2024-10-02 DIAGNOSIS — Z96.41 INSULIN PUMP IN PLACE: ICD-10-CM

## 2024-10-02 DIAGNOSIS — E10.42 TYPE 1 DIABETES MELLITUS WITH POLYNEUROPATHY (HCC): ICD-10-CM

## 2024-10-02 LAB — HBA1C MFR BLD: 6.6 %

## 2024-10-02 NOTE — PROGRESS NOTES
06/05/2024 12:00 AM    LABA1C 6.3 03/26/2024 01:17 PM     Lab Results   Component Value Date/Time    TRIG 110 06/22/2016 12:00 AM    HDL 67 06/22/2016 12:00 AM    CHOL 206 06/22/2016 12:00 AM     Lab Results   Component Value Date/Time    VITD25 58.6 01/12/2024 11:45 AM    VITD25 >120 11/21/2018 02:11 PM       ASSESSMENT & RECOMMENDATIONS   Lucina Ma, a 44 y.o.-old female seen in for the following issues      Diagnosis Orders   1. Type 1 diabetes mellitus with hyperglycemia (HCC)  POCT glycosylated hemoglobin (Hb A1C)    GLUCOSE MONITOR, 72 HOUR, PHYS INTERP      2. Insulin pump in place        3. Vitamin D deficiency        4. Renovascular hypertension        5. Type 1 diabetes mellitus with polyneuropathy (HCC)        6. Type 1 diabetes mellitus with stage 5 chronic kidney disease not on chronic dialysis (HCC)              Diabetes Mellitus Type 1     Patient's diabetes well controlled .  Hba1c 6.7%-> 6.6%  Insulin pump and CGM reviewed   Basal Midnight 0.65, 8 am 0.6,  3pm 0.65 , 8pm 0.6,CR 12 am 7, 3am  8, 8 am 6.5, ISF 12am 75,3 am 80,  3pm 75, 8pm 70, BS Goal 110, Active insulin time 3.0  The patient counseled about the complications of uncontrolled diabetes   Patient was counselled about the importance of self-blood glucose monitoring and eating consistent carb diet to avoid blood sugar fluctuations   Labs reviewed and located in care everywhere  Insulin pump and CGM reviewed    Continuous Glucose Monitoring (CGM) download and interpretation   I personally reviewed and interpreted continuous glucose monitor (CGM) download. CGM report was discussed with patient including blood glucose patterns, percentages of blood glucose at goal, above goal and below goal. Insulin dosages/antidiabetic regimen was adjusted according to CGM download. Full CGM was scanned under media.     Insulin pump in place  Insulin pump and CGM reviewed    Vitamin D deficiency  Continue vitamin D.    Last vitamin D within normal

## 2025-01-06 ENCOUNTER — APPOINTMENT (OUTPATIENT)
Dept: OBSTETRICS AND GYNECOLOGY | Facility: CLINIC | Age: 45
End: 2025-01-06
Payer: MEDICARE

## 2025-01-15 ENCOUNTER — OFFICE VISIT (OUTPATIENT)
Dept: ENDOCRINOLOGY | Age: 45
End: 2025-01-15
Payer: MEDICARE

## 2025-01-15 VITALS
HEIGHT: 63 IN | BODY MASS INDEX: 24.8 KG/M2 | DIASTOLIC BLOOD PRESSURE: 82 MMHG | WEIGHT: 140 LBS | SYSTOLIC BLOOD PRESSURE: 134 MMHG

## 2025-01-15 DIAGNOSIS — E10.42 TYPE 1 DIABETES MELLITUS WITH POLYNEUROPATHY (HCC): ICD-10-CM

## 2025-01-15 DIAGNOSIS — I15.0 RENOVASCULAR HYPERTENSION: ICD-10-CM

## 2025-01-15 DIAGNOSIS — Z96.41 INSULIN PUMP IN PLACE: ICD-10-CM

## 2025-01-15 DIAGNOSIS — E55.9 VITAMIN D DEFICIENCY: ICD-10-CM

## 2025-01-15 DIAGNOSIS — M54.50 ACUTE MIDLINE LOW BACK PAIN WITHOUT SCIATICA: ICD-10-CM

## 2025-01-15 DIAGNOSIS — E10.65 TYPE 1 DIABETES MELLITUS WITH HYPERGLYCEMIA (HCC): Primary | ICD-10-CM

## 2025-01-15 DIAGNOSIS — N18.5 TYPE 1 DIABETES MELLITUS WITH STAGE 5 CHRONIC KIDNEY DISEASE NOT ON CHRONIC DIALYSIS (HCC): ICD-10-CM

## 2025-01-15 DIAGNOSIS — E10.22 TYPE 1 DIABETES MELLITUS WITH STAGE 5 CHRONIC KIDNEY DISEASE NOT ON CHRONIC DIALYSIS (HCC): ICD-10-CM

## 2025-01-15 DIAGNOSIS — R82.90 MALODOROUS URINE: ICD-10-CM

## 2025-01-15 LAB — HBA1C MFR BLD: 6.8 %

## 2025-01-15 PROCEDURE — 3044F HG A1C LEVEL LT 7.0%: CPT | Performed by: NURSE PRACTITIONER

## 2025-01-15 PROCEDURE — 1036F TOBACCO NON-USER: CPT | Performed by: NURSE PRACTITIONER

## 2025-01-15 PROCEDURE — G8427 DOCREV CUR MEDS BY ELIG CLIN: HCPCS | Performed by: NURSE PRACTITIONER

## 2025-01-15 PROCEDURE — 83036 HEMOGLOBIN GLYCOSYLATED A1C: CPT | Performed by: NURSE PRACTITIONER

## 2025-01-15 PROCEDURE — G8420 CALC BMI NORM PARAMETERS: HCPCS | Performed by: NURSE PRACTITIONER

## 2025-01-15 PROCEDURE — 2022F DILAT RTA XM EVC RTNOPTHY: CPT | Performed by: NURSE PRACTITIONER

## 2025-01-15 PROCEDURE — 99214 OFFICE O/P EST MOD 30 MIN: CPT | Performed by: NURSE PRACTITIONER

## 2025-01-15 PROCEDURE — 95251 CONT GLUC MNTR ANALYSIS I&R: CPT | Performed by: NURSE PRACTITIONER

## 2025-01-15 NOTE — PROGRESS NOTES
[facility] [employer]  [facility] Department of Endocrinology Diabetes and Metabolism   [address], [place] [address]  Phone: [phone]  Fax: [phone]    Date of Service: 1/15/2025    Primary Care Physician: Justo Moore III, DO  Referring physician: No ref. provider found  Provider: TEN Ryan NP      Reason for the visit: Type 1 diabetes       History of Present Illness:  The history is provided by the patient. No  was used. Accuracy of the patient data is excellent.  Lucina Ma is a very pleasant 44 y.o. female seen today for diabetes management     Lucina Ma was diagnosed with diabetes at age 8  and currently on omipod 5 and dexcom    Basal Midnight 0.65, 8 am 0.6,  3pm 0.65 , 8pm 0.6,CR 12 am 6.7, 3am  8, 8 am 6.5, 8pm 6.2 ISF 12am 77, 3 am 80,  3pm 75, 8pm 75, BS Goal 110, Active insulin time 2.5    Pt self adjusts her pump settings on her own    The patient has been checking blood sugar uses dexcom   Ambulatory continuous glucose monitoring of interstitial tissue fluid via a subcutaneous sensor for a minimum of 72 hours; analysis, interpretation and report    Time in range 77%  Average glucose 145  Hyperglycemia 21%  Low 2%    TDD 24.2 units    Most recent A1c results summarized below  Lab Results   Component Value Date/Time    LABA1C 6.8 01/15/2025 02:23 PM    LABA1C 6.6 10/02/2024 03:48 PM    LABA1C 6.7 06/05/2024 12:00 AM     Patient reported some  hypoglycemic episodes    The patient has been mindful of what has been eating and following diabetic diet as encouraged    I reviewed current medications and the patient has no issues with diabetes medications    Last eye exam was 6/2021   ,+ diabetic retinopathy.  Dr Loya.  [facility]  The patient seeing podiatrist, every 3 months   Microvascular complications:  + Retinopathy, + Nephropathy , + Neuropathy   Macrovascular complications: no CAD, PVD, or

## 2025-01-17 DIAGNOSIS — R82.90 MALODOROUS URINE: ICD-10-CM

## 2025-01-17 DIAGNOSIS — M54.50 ACUTE MIDLINE LOW BACK PAIN WITHOUT SCIATICA: ICD-10-CM

## 2025-01-20 ENCOUNTER — TELEPHONE (OUTPATIENT)
Dept: ENDOCRINOLOGY | Age: 45
End: 2025-01-20

## 2025-04-09 ENCOUNTER — OFFICE VISIT (OUTPATIENT)
Dept: ENDOCRINOLOGY | Age: 45
End: 2025-04-09
Payer: MEDICARE

## 2025-04-09 VITALS
DIASTOLIC BLOOD PRESSURE: 80 MMHG | SYSTOLIC BLOOD PRESSURE: 122 MMHG | WEIGHT: 137 LBS | TEMPERATURE: 98.3 F | HEIGHT: 63 IN | BODY MASS INDEX: 24.27 KG/M2

## 2025-04-09 DIAGNOSIS — I15.0 RENOVASCULAR HYPERTENSION: ICD-10-CM

## 2025-04-09 DIAGNOSIS — E55.9 VITAMIN D DEFICIENCY: ICD-10-CM

## 2025-04-09 DIAGNOSIS — N18.5 TYPE 1 DIABETES MELLITUS WITH STAGE 5 CHRONIC KIDNEY DISEASE NOT ON CHRONIC DIALYSIS (HCC): Primary | ICD-10-CM

## 2025-04-09 DIAGNOSIS — E10.22 TYPE 1 DIABETES MELLITUS WITH STAGE 5 CHRONIC KIDNEY DISEASE NOT ON CHRONIC DIALYSIS (HCC): Primary | ICD-10-CM

## 2025-04-09 DIAGNOSIS — Z96.41 INSULIN PUMP IN PLACE: ICD-10-CM

## 2025-04-09 DIAGNOSIS — E10.319 TYPE 1 DIABETES MELLITUS WITH RETINOPATHY, MACULAR EDEMA PRESENCE UNSPECIFIED, UNSPECIFIED LATERALITY, UNSPECIFIED RETINOPATHY SEVERITY (HCC): ICD-10-CM

## 2025-04-09 DIAGNOSIS — E10.42 TYPE 1 DIABETES MELLITUS WITH POLYNEUROPATHY (HCC): ICD-10-CM

## 2025-04-09 LAB — HBA1C MFR BLD: 6 %

## 2025-04-09 PROCEDURE — G8427 DOCREV CUR MEDS BY ELIG CLIN: HCPCS | Performed by: NURSE PRACTITIONER

## 2025-04-09 PROCEDURE — 2022F DILAT RTA XM EVC RTNOPTHY: CPT | Performed by: NURSE PRACTITIONER

## 2025-04-09 PROCEDURE — 95251 CONT GLUC MNTR ANALYSIS I&R: CPT | Performed by: NURSE PRACTITIONER

## 2025-04-09 PROCEDURE — 3044F HG A1C LEVEL LT 7.0%: CPT | Performed by: NURSE PRACTITIONER

## 2025-04-09 PROCEDURE — 83036 HEMOGLOBIN GLYCOSYLATED A1C: CPT | Performed by: NURSE PRACTITIONER

## 2025-04-09 PROCEDURE — 1036F TOBACCO NON-USER: CPT | Performed by: NURSE PRACTITIONER

## 2025-04-09 PROCEDURE — 99214 OFFICE O/P EST MOD 30 MIN: CPT | Performed by: NURSE PRACTITIONER

## 2025-04-09 PROCEDURE — G8420 CALC BMI NORM PARAMETERS: HCPCS | Performed by: NURSE PRACTITIONER

## 2025-04-09 NOTE — PROGRESS NOTES
The patient refuses Flushot and pneumonia vaccine     PAST MEDICAL HISTORY   Past Medical History:   Diagnosis Date    Asthma     allergy induced    Blindness     Left eye due to Diabetes    Chronic renal failure     Degeneration of cervical intervertebral disc     Depression     Not on medication    Diabetic peripheral neuropathy associated with type 1 diabetes mellitus (HCC) 7/19/2019    Diabetic retinopathy (HCC)     Hyperlipidemia     Hypertension     Peripheral autonomic neuropathy due to diabetes mellitus (HCC)     Type 1 diabetes mellitus (HCC)        PAST SURGICAL HISTORY   Past Surgical History:   Procedure Laterality Date    HYSTERECTOMY, TOTAL ABDOMINAL (CERVIX REMOVED)      SHOULDER SURGERY      TRANSTHORACIC ECHOCARDIOGRAM  4/9/13    LVEF 55%    US BREAST BIOPSY W LOC DEVICE 1ST LESION RIGHT Right 4/6/2021    US BREAST NEEDLE BIOPSY RIGHT 4/6/2021 [facility]    VITRECTOMY Right 2009    VITRECTOMY Left 2008       SOCIAL HISTORY   Tobacco:   reports that she has never smoked. She has never used smokeless tobacco.  Alcohol:   reports that she does not currently use alcohol.  Drugs:   reports current drug use. Drug: Marijuana (Weed).    FAMILY HISTORY   Family History   Problem Relation Age of Onset    Arthritis Mother     Arthritis Father     Heart Failure Father     Other Brother         sarcodoma    Diabetes Maternal Uncle     Diabetes Maternal Cousin     Diabetes Maternal Cousin     Diabetes Maternal Grandmother         type 2 age onset       ALLERGIES AND DRUG REACTIONS   Allergies   Allergen Reactions    Lisinopril     Adhesive Tape Rash    Morphine Nausea And Vomiting       CURRENT MEDICATIONS   Current Outpatient Medications   Medication Sig Dispense Refill    insulin lispro (HUMALOG) 100 UNIT/ML injection vial 100 units/day via insulin pump 30 mL 5    IRON-FA-DSS-B CMPLX-VIT C PO Take 1 tablet by mouth daily (with breakfast)      Calcium Citrate-Vitamin D3 315-6.25 MG-MCG TABS Take by mouth

## 2025-07-15 ENCOUNTER — APPOINTMENT (OUTPATIENT)
Dept: OBSTETRICS AND GYNECOLOGY | Facility: CLINIC | Age: 45
End: 2025-07-15
Payer: MEDICARE

## 2025-07-17 DIAGNOSIS — N18.5 TYPE 1 DIABETES MELLITUS WITH STAGE 5 CHRONIC KIDNEY DISEASE NOT ON CHRONIC DIALYSIS (HCC): Primary | ICD-10-CM

## 2025-07-17 DIAGNOSIS — E10.22 TYPE 1 DIABETES MELLITUS WITH STAGE 5 CHRONIC KIDNEY DISEASE NOT ON CHRONIC DIALYSIS (HCC): Primary | ICD-10-CM

## 2025-07-17 DIAGNOSIS — E10.65 TYPE 1 DIABETES MELLITUS WITH HYPERGLYCEMIA (HCC): ICD-10-CM

## 2025-07-17 DIAGNOSIS — E10.319 TYPE 1 DIABETES MELLITUS WITH RETINOPATHY, MACULAR EDEMA PRESENCE UNSPECIFIED, UNSPECIFIED LATERALITY, UNSPECIFIED RETINOPATHY SEVERITY (HCC): ICD-10-CM

## 2025-07-17 DIAGNOSIS — E10.42 TYPE 1 DIABETES MELLITUS WITH POLYNEUROPATHY (HCC): ICD-10-CM

## 2025-07-17 LAB — HBA1C MFR BLD: 6.1 %

## 2025-07-18 ENCOUNTER — TELEMEDICINE (OUTPATIENT)
Dept: ENDOCRINOLOGY | Age: 45
End: 2025-07-18

## 2025-07-18 DIAGNOSIS — I15.0 RENOVASCULAR HYPERTENSION: ICD-10-CM

## 2025-07-18 DIAGNOSIS — E10.319 TYPE 1 DIABETES MELLITUS WITH RETINOPATHY, MACULAR EDEMA PRESENCE UNSPECIFIED, UNSPECIFIED LATERALITY, UNSPECIFIED RETINOPATHY SEVERITY (HCC): ICD-10-CM

## 2025-07-18 DIAGNOSIS — Z96.41 INSULIN PUMP IN PLACE: ICD-10-CM

## 2025-07-18 DIAGNOSIS — E55.9 VITAMIN D DEFICIENCY: ICD-10-CM

## 2025-07-18 DIAGNOSIS — E10.42 TYPE 1 DIABETES MELLITUS WITH POLYNEUROPATHY (HCC): ICD-10-CM

## 2025-07-18 DIAGNOSIS — N18.5 TYPE 1 DIABETES MELLITUS WITH STAGE 5 CHRONIC KIDNEY DISEASE NOT ON CHRONIC DIALYSIS (HCC): Primary | ICD-10-CM

## 2025-07-18 DIAGNOSIS — E10.22 TYPE 1 DIABETES MELLITUS WITH STAGE 5 CHRONIC KIDNEY DISEASE NOT ON CHRONIC DIALYSIS (HCC): Primary | ICD-10-CM

## 2025-07-18 NOTE — PROGRESS NOTES
[facility] [employer]  [facility] Department of Endocrinology Diabetes and Metabolism   [address], [place] [address]  Phone: [phone]  Fax: [phone]    Date of Service: 7/18/2025    Primary Care Physician: Justo Moore III, DO  Referring physician: No ref. provider found  Provider: TEN Ryan NP      Reason for the visit: Type 1 diabetes       History of Present Illness:  The history is provided by the patient. No  was used. Accuracy of the patient data is excellent.  Lucina Ma is a very pleasant 44 y.o. female seen today for diabetes management     Lucina Ma was diagnosed with diabetes at age 8  and currently on omipod 5 and dexcom    Basal Midnight 0.65, 8 am 0.6,  3pm 0.65 , 8pm 0.6,CR 12 am 6.7, 3am  8, 8 am 6.5, 8pm 6.2 ISF 12am 77, 3 am 80,  3pm 75, 8pm 75, BS Goal 110, AIT 2.5    Pt self adjusts her pump settings on her own    The patient has been checking blood sugar uses dexcom   Ambulatory continuous glucose monitoring of interstitial tissue fluid via a subcutaneous sensor for a minimum of 72 hours; analysis, interpretation and report    Time in range 74%  Average glucose 148  Hyperglycemia 23%  Low 3%    TDD 22.3 units    Most recent A1c results summarized below  Lab Results   Component Value Date/Time    LABA1C 6.1 07/17/2025 04:02 PM    LABA1C 6.0 04/09/2025 01:54 PM    LABA1C 6.8 01/15/2025 02:23 PM     Patient reported some  hypoglycemic episodes    The patient has been mindful of what has been eating and following diabetic diet as encouraged    I reviewed current medications and the patient has no issues with diabetes medications    Last eye exam was 6/2021   ,+ diabetic retinopathy.  Dr Loya.  [facility]  The patient seeing podiatrist, every 3 months   Microvascular complications:  + Retinopathy, + Nephropathy , + Neuropathy   Macrovascular complications: no CAD, PVD, or Stroke    +

## 2025-07-19 RX ORDER — INSULIN LISPRO 100 [IU]/ML
INJECTION, SOLUTION INTRAVENOUS; SUBCUTANEOUS
Qty: 60 ML | Refills: 3 | Status: SHIPPED | OUTPATIENT
Start: 2025-07-19

## 2025-09-05 ENCOUNTER — OFFICE VISIT (OUTPATIENT)
Dept: FAMILY MEDICINE CLINIC | Age: 45
End: 2025-09-05

## 2025-09-05 VITALS
BODY MASS INDEX: 24.98 KG/M2 | HEART RATE: 86 BPM | SYSTOLIC BLOOD PRESSURE: 120 MMHG | WEIGHT: 141 LBS | DIASTOLIC BLOOD PRESSURE: 64 MMHG | OXYGEN SATURATION: 100 % | TEMPERATURE: 97.8 F | HEIGHT: 63 IN

## 2025-09-05 DIAGNOSIS — M25.552 BILATERAL HIP PAIN: ICD-10-CM

## 2025-09-05 DIAGNOSIS — M54.50 ACUTE LEFT-SIDED LOW BACK PAIN WITHOUT SCIATICA: ICD-10-CM
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
## 2025-09-05 DIAGNOSIS — M25.551 BILATERAL HIP PAIN: ICD-10-CM

## 2025-09-05 ASSESSMENT — ENCOUNTER SYMPTOMS
GASTROINTESTINAL NEGATIVE: 1
RESPIRATORY NEGATIVE: 1